# Patient Record
Sex: FEMALE | NOT HISPANIC OR LATINO | Employment: FULL TIME | URBAN - METROPOLITAN AREA
[De-identification: names, ages, dates, MRNs, and addresses within clinical notes are randomized per-mention and may not be internally consistent; named-entity substitution may affect disease eponyms.]

---

## 2018-08-09 ENCOUNTER — OFFICE VISIT (OUTPATIENT)
Dept: FAMILY MEDICINE | Facility: CLINIC | Age: 48
End: 2018-08-09
Payer: COMMERCIAL

## 2018-08-09 VITALS
SYSTOLIC BLOOD PRESSURE: 102 MMHG | WEIGHT: 131.6 LBS | TEMPERATURE: 99 F | HEART RATE: 72 BPM | RESPIRATION RATE: 12 BRPM | HEIGHT: 65 IN | DIASTOLIC BLOOD PRESSURE: 60 MMHG | BODY MASS INDEX: 21.92 KG/M2 | OXYGEN SATURATION: 97 %

## 2018-08-09 DIAGNOSIS — Z00.00 ROUTINE MEDICAL EXAM: Primary | ICD-10-CM

## 2018-08-09 DIAGNOSIS — Z12.31 SCREENING MAMMOGRAM, ENCOUNTER FOR: ICD-10-CM

## 2018-08-09 LAB
BILIRUBIN, POC: NEGATIVE
BLOOD URINE, POC: NEGATIVE
CLARITY, POC: CLEAR
COLOR, POC: YELLOW
GLUCOSE URINE, POC: NEGATIVE
KETONES, POC: NEGATIVE
LEUKOCYTE EST, POC: NEGATIVE
NITRITE, POC: NEGATIVE
PH, POC: 7
PROTEIN, POC: NEGATIVE
SPECIFIC GRAVITY, POC: 1
UROBILINOGEN, POC: 0.2

## 2018-08-09 PROCEDURE — 93000 ELECTROCARDIOGRAM COMPLETE: CPT | Performed by: FAMILY MEDICINE

## 2018-08-09 PROCEDURE — 99386 PREV VISIT NEW AGE 40-64: CPT | Performed by: FAMILY MEDICINE

## 2018-08-09 PROCEDURE — 81002 URINALYSIS NONAUTO W/O SCOPE: CPT | Performed by: FAMILY MEDICINE

## 2018-08-09 ASSESSMENT — VISUAL ACUITY
OS_CC: 20/50
OD_CC: 20/25

## 2018-08-09 NOTE — PROGRESS NOTES
[facility] at [facility]     Reason for visit:   Chief Complaint   Patient presents with   • Annual Exam      HPI  Emma Bojorquez is a 48 y.o. female    Has a history of 1-2 uti per year  Using cranberry tablets, helping her    Exercising 7 days per week  Eating healthy  Taking mvi daily      HM  Last pap - 2018, wnl  Last mammogram - not done yet       Review of Systems   Constitutional: Negative for chills, fatigue, fever and unexpected weight change.   HENT: Negative for ear pain, hearing loss, postnasal drip, sinus pressure, sore throat and trouble swallowing.    Eyes: Negative for pain and redness.   Respiratory: Negative for cough, shortness of breath and wheezing.    Cardiovascular: Negative for chest pain and palpitations.   Gastrointestinal: Negative for abdominal pain, constipation, diarrhea, nausea and vomiting.   Skin: Negative for color change and rash.   Neurological: Negative for dizziness, weakness, light-headedness and headaches.   Psychiatric/Behavioral: Negative for suicidal ideas. The patient is not nervous/anxious.    All other systems reviewed and are negative.       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 No pertinent past medical history.  Past Surgical History:   Procedure Laterality Date   • APPENDECTOMY  1996     Social History     Social History   • Marital status:      Spouse name: N/A   • Number of children: N/A   • Years of education: N/A     Occupational History   • Not on file.     Social History Main Topics   • Smoking status: Never Smoker   • Smokeless tobacco: Never Used   • Alcohol use No   • Drug use: No   • Sexual activity: Not on file     Other Topics Concern   • Not on file     Social History Narrative   • No narrative on file     Family History   Problem Relation Age of Onset   • No Known Problems Mother    • No Known Problems Father        Allergies:  Patient  "has no known allergies.    Current Outpatient Prescriptions   Medication Sig Dispense Refill   • cranberry fruit extract (CRANBERRY ORAL) Take by mouth.       No current facility-administered medications for this visit.         Objective   Vitals:    08/09/18 1014   BP: 102/60   Pulse: 72   Resp: 12   Temp: 37.2 °C (99 °F)   TempSrc: Oral   SpO2: 97%   Weight: 59.7 kg (131 lb 9.6 oz)   Height: 1.638 m (5' 4.5\")     Ht Readings from Last 3 Encounters:   08/09/18 1.638 m (5' 4.5\")     Wt Readings from Last 3 Encounters:   08/09/18 59.7 kg (131 lb 9.6 oz)     Body mass index is 22.24 kg/m².    Physical Exam   Constitutional: She is oriented to person, place, and time. She appears well-developed and well-nourished.   HENT:   Head: Normocephalic and atraumatic.   Right Ear: Hearing, tympanic membrane, external ear and ear canal normal.   Left Ear: Hearing, tympanic membrane, external ear and ear canal normal.   Nose: Nose normal.   Mouth/Throat: Uvula is midline, oropharynx is clear and moist and mucous membranes are normal. No oropharyngeal exudate or posterior oropharyngeal edema. Tonsils are 0 on the right. Tonsils are 0 on the left. No tonsillar exudate.   Eyes: Conjunctivae, EOM and lids are normal. Pupils are equal, round, and reactive to light.   Neck: Normal range of motion. Neck supple. Carotid bruit is not present. No thyromegaly present.   Cardiovascular: Normal rate, regular rhythm, normal heart sounds and intact distal pulses.  Exam reveals no gallop and no friction rub.    No murmur heard.  Pulmonary/Chest: Effort normal and breath sounds normal. She has no wheezes. She has no rhonchi. She has no rales. She exhibits no deformity.   Abdominal: Soft. Bowel sounds are normal. There is no hepatosplenomegaly. There is no tenderness. There is no rebound and no guarding.   Lymphadenopathy:        Head (right side): No submandibular adenopathy present.        Head (left side): No submandibular adenopathy present. "     She has no cervical adenopathy.        Right: No inguinal adenopathy present.        Left: No inguinal adenopathy present.   Neurological: She is alert and oriented to person, place, and time. She has normal strength. No cranial nerve deficit.   Reflex Scores:       Bicep reflexes are 2+ on the right side and 2+ on the left side.       Patellar reflexes are 2+ on the right side and 2+ on the left side.  Skin: Skin is warm, dry and intact. Capillary refill takes less than 2 seconds. No rash noted.   Psychiatric: She has a normal mood and affect. Her speech is normal and behavior is normal. Judgment and thought content normal. Cognition and memory are normal.   Nursing note and vitals reviewed.       No results found for: WBC, HGB, HCT, PLT, NA, K, CL, BUN, CREATININE, CO2, ALT, AST, CHOL, TRIG, HDL, LDLCALC, LDLDIRECT, TSH, PSA, INR, HGBA1C, MICROALBUR, HSVT30ERS, HEPCAB    Point of Care Testing Results  Results for orders placed or performed in visit on 08/09/18   POCT urinalysis dipstick   Result Value Ref Range    Color, UA Yellow     Clarity, UA Clear     Glucose, UA Negative     Bilirubin, UA Negative     Ketones, UA Negative     Spec Grav, UA 1.000     Blood, UA Negative     pH, UA 7.0     Protein, UA Negative     Urobilinogen, UA 0.2     Leukocytes, UA Negative     Nitrite, UA Negative         Assessment   Problem List Items Addressed This Visit     None      Visit Diagnoses     Routine medical exam    -  Primary    Relevant Orders    POCT urinalysis dipstick (Completed)    ECG 12 LEAD OFFICE PERFORMED (Completed)    CBC and Differential    Comprehensive metabolic panel    Lipid panel    TSH w reflex FT4    Screening mammogram, encounter for        Relevant Orders    BI SCREENING MAMMOGRAM BILATERAL        Recommend healthy diet/ exercise  Encourage weight management to appropriate goal as discussed with patient.  Check labs as listed  Check ekg -> sinus rhythm, normal ekg  Mammogram script given  Continue  cranberry tablets daily with fluid  Discussed history of positive ppd and bcg vaccine      Patient has been educated on the risks, benefits, and side effects of all medication prescribed at this visit.  Patient expressed understanding and agreement with plan.  Return if symptoms worsen or fail to improve.  Nguyen Barrera MD  8/10/2018       There are no Patient Instructions on file for this visit.

## 2018-08-10 ASSESSMENT — ENCOUNTER SYMPTOMS
SHORTNESS OF BREATH: 0
COLOR CHANGE: 0
TROUBLE SWALLOWING: 0
CONSTIPATION: 0
NERVOUS/ANXIOUS: 0
SINUS PRESSURE: 0
DIARRHEA: 0
SORE THROAT: 0
LIGHT-HEADEDNESS: 0
HEADACHES: 0
PALPITATIONS: 0
FEVER: 0
EYE PAIN: 0
CHILLS: 0
WEAKNESS: 0
FATIGUE: 0
NAUSEA: 0
EYE REDNESS: 0
COUGH: 0
ABDOMINAL PAIN: 0
DIZZINESS: 0
WHEEZING: 0
UNEXPECTED WEIGHT CHANGE: 0
VOMITING: 0

## 2018-09-09 LAB
ALBUMIN SERPL-MCNC: 4.3 G/DL (ref 3.6–5.1)
ALBUMIN/GLOB SERPL: 1.5 (CALC) (ref 1–2.5)
ALP SERPL-CCNC: 60 U/L (ref 33–115)
ALT SERPL-CCNC: 12 U/L (ref 6–29)
AST SERPL-CCNC: 13 U/L (ref 10–35)
BASOPHILS # BLD AUTO: 29 CELLS/UL (ref 0–200)
BASOPHILS NFR BLD AUTO: 0.5 %
BILIRUB SERPL-MCNC: 0.8 MG/DL (ref 0.2–1.2)
BUN SERPL-MCNC: 10 MG/DL (ref 7–25)
BUN/CREAT SERPL: NORMAL (CALC) (ref 6–22)
CALCIUM SERPL-MCNC: 9.1 MG/DL (ref 8.6–10.2)
CHLORIDE SERPL-SCNC: 104 MMOL/L (ref 98–110)
CHOLEST SERPL-MCNC: 192 MG/DL
CHOLEST/HDLC SERPL: 3.3 (CALC)
CO2 SERPL-SCNC: 25 MMOL/L (ref 20–32)
CREAT SERPL-MCNC: 0.72 MG/DL (ref 0.5–1.1)
EOSINOPHIL # BLD AUTO: 80 CELLS/UL (ref 15–500)
EOSINOPHIL NFR BLD AUTO: 1.4 %
ERYTHROCYTE [DISTWIDTH] IN BLOOD BY AUTOMATED COUNT: 12.2 % (ref 11–15)
GFR SERPL CREATININE-BSD FRML MDRD: 100 ML/MIN/1.73M2
GLOBULIN SER CALC-MCNC: 2.8 G/DL (CALC) (ref 1.9–3.7)
GLUCOSE SERPL-MCNC: 82 MG/DL (ref 65–99)
HCT VFR BLD AUTO: 40 % (ref 35–45)
HDLC SERPL-MCNC: 59 MG/DL
HGB BLD-MCNC: 13.3 G/DL (ref 11.7–15.5)
LDLC SERPL CALC-MCNC: 113 MG/DL (CALC)
LYMPHOCYTES # BLD AUTO: 1476 CELLS/UL (ref 850–3900)
LYMPHOCYTES NFR BLD AUTO: 25.9 %
MCH RBC QN AUTO: 32.7 PG (ref 27–33)
MCHC RBC AUTO-ENTMCNC: 33.3 G/DL (ref 32–36)
MCV RBC AUTO: 98.3 FL (ref 80–100)
MONOCYTES # BLD AUTO: 342 CELLS/UL (ref 200–950)
MONOCYTES NFR BLD AUTO: 6 %
NEUTROPHILS # BLD AUTO: 3773 CELLS/UL (ref 1500–7800)
NEUTROPHILS NFR BLD AUTO: 66.2 %
NONHDLC SERPL-MCNC: 133 MG/DL (CALC)
PLATELET # BLD AUTO: 285 THOUSAND/UL (ref 140–400)
PMV BLD REES-ECKER: 12.1 FL (ref 7.5–12.5)
POTASSIUM SERPL-SCNC: 4 MMOL/L (ref 3.5–5.3)
PROT SERPL-MCNC: 7.1 G/DL (ref 6.1–8.1)
RBC # BLD AUTO: 4.07 MILLION/UL (ref 3.8–5.1)
SODIUM SERPL-SCNC: 138 MMOL/L (ref 135–146)
TRIGL SERPL-MCNC: 96 MG/DL
TSH SERPL-ACNC: 1.55 MIU/L
WBC # BLD AUTO: 5.7 THOUSAND/UL (ref 3.8–10.8)

## 2018-09-11 ENCOUNTER — TELEPHONE (OUTPATIENT)
Dept: FAMILY MEDICINE | Facility: CLINIC | Age: 48
End: 2018-09-11

## 2018-09-11 NOTE — TELEPHONE ENCOUNTER
----- Message from Nguyen Barrera MD sent at 9/10/2018  7:02 PM EDT -----  Labs are wnl, lipids wnl

## 2018-09-21 ENCOUNTER — TELEPHONE (OUTPATIENT)
Dept: FAMILY MEDICINE | Facility: CLINIC | Age: 48
End: 2018-09-21

## 2018-09-21 NOTE — TELEPHONE ENCOUNTER
Pt stated she is experiencing very dry scalp and skin (forehead), which is shedding. She is asking what she could use to help alleviate the dryness and should she come in for an appt.

## 2018-09-24 NOTE — TELEPHONE ENCOUNTER
Recommend shampoo containing selenium sulfide such as head and shoulders 2x per week.  Helps rejuvenate the scalp.

## 2019-04-12 ENCOUNTER — OFFICE VISIT (OUTPATIENT)
Dept: FAMILY MEDICINE | Facility: CLINIC | Age: 49
End: 2019-04-12
Payer: COMMERCIAL

## 2019-04-12 VITALS
DIASTOLIC BLOOD PRESSURE: 60 MMHG | SYSTOLIC BLOOD PRESSURE: 102 MMHG | OXYGEN SATURATION: 98 % | HEART RATE: 68 BPM | TEMPERATURE: 98.1 F | BODY MASS INDEX: 21.99 KG/M2 | WEIGHT: 130.13 LBS | RESPIRATION RATE: 16 BRPM

## 2019-04-12 DIAGNOSIS — R10.12 LEFT UPPER QUADRANT PAIN: Primary | ICD-10-CM

## 2019-04-12 DIAGNOSIS — Z00.00 LABORATORY EXAM ORDERED AS PART OF ROUTINE GENERAL MEDICAL EXAMINATION: ICD-10-CM

## 2019-04-12 PROCEDURE — 99213 OFFICE O/P EST LOW 20 MIN: CPT | Performed by: FAMILY MEDICINE

## 2019-04-12 NOTE — PROGRESS NOTES
[facility] Medicine at [facility]     Reason for visit:   Chief Complaint   Patient presents with   • Pain     left side off and on      Liyjudah Bojorquez is a 49 y.o. female    HPI    Mild pain on L side of abdomen for 6months  Comes and goes, no radiation  No accident or injury  Pain over lower ribs on the side of abdomen  Worse during the week as the day progresses  Never feels pain on the weekend, denies bending or lifting  No nausea/ vomiting/ diarrhea/ constipation/ dysuria  Doing some light exercises, but no change recently  No abd/ core exercises    No meds tried     Review of Systems   Constitutional: Negative for chills, fever and unexpected weight change.   HENT: Negative for ear pain, postnasal drip, sinus pain and sore throat.    Eyes: Negative for pain.   Respiratory: Negative for cough and shortness of breath.    Cardiovascular: Negative for chest pain and palpitations.   Gastrointestinal: Positive for abdominal pain. Negative for diarrhea, nausea and vomiting.   Musculoskeletal: Negative for arthralgias and joint swelling.   Skin: Negative for rash and wound.   Neurological: Negative for dizziness, syncope, weakness and headaches.   Psychiatric/Behavioral: The patient is not nervous/anxious.    All other systems reviewed and are negative.       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 No pertinent past medical history.  Past Surgical History:   Procedure Laterality Date   • APPENDECTOMY  1996     Social History     Social History   • Marital status:      Spouse name: N/A   • Number of children: N/A   • Years of education: N/A     Occupational History   • Not on file.     Social History Main Topics   • Smoking status: Never Smoker   • Smokeless tobacco: Never Used   • Alcohol use No   • Drug use: No   • Sexual activity: Not on file     Other Topics Concern   • Not on file     Social History Narrative  "  • No narrative on file     Family History   Problem Relation Age of Onset   • No Known Problems Mother    • No Known Problems Father        Allergies:  Patient has no known allergies.    Current Outpatient Prescriptions   Medication Sig Dispense Refill   • cranberry fruit extract (CRANBERRY ORAL) Take by mouth.       No current facility-administered medications for this visit.         Objective   Vitals:    04/12/19 1144   BP: 102/60   Pulse: 68   Resp: 16   Temp: 36.7 °C (98.1 °F)   SpO2: 98%   Weight: 59 kg (130 lb 2 oz)     Ht Readings from Last 3 Encounters:   08/09/18 1.638 m (5' 4.5\")     Wt Readings from Last 3 Encounters:   04/12/19 59 kg (130 lb 2 oz)   08/09/18 59.7 kg (131 lb 9.6 oz)     Body mass index is 21.99 kg/m².    Physical Exam   Constitutional: She is oriented to person, place, and time. She appears well-developed and well-nourished. No distress.   Eyes: Conjunctivae and EOM are normal.   Neck: Normal range of motion. Neck supple.   Cardiovascular: Normal rate and regular rhythm.  Exam reveals no gallop and no friction rub.    No murmur heard.  Pulmonary/Chest: Effort normal. She has no wheezes.   Abdominal:   +bowel sounds, non distended, abdomen is soft  Mild diffuse tenderness in L sided lower floating ribs and overlying soft tissue.  No true abdominal pain.   Neurological: She is alert and oriented to person, place, and time.   Skin: Skin is warm and dry.   Psychiatric: She has a normal mood and affect. Her behavior is normal. Judgment and thought content normal.   Vitals reviewed.      Point of Care Testing Results  No results found for this visit on 04/12/19.     Assessment   Diagnoses and all orders for this visit:    Left upper quadrant pain (Primary)  Comments:  suspect muscular pain from positioning at work  heat/ gentle stretching  ibuprofen 400mg qd x 4 days      Laboratory exam ordered as part of routine general medical examination  -     CBC and Differential; Future  -     " Comprehensive metabolic panel; Future  -     Lipid panel; Future  -     TSH 3rd Generation; Future        Patient has been educated on the risks, benefits, and side effects of all medication prescribed at this visit, and will contact me with any further questions.  Patient expressed understanding and agreement with plan.  Return if symptoms worsen or fail to improve.    Nguyen Barrera MD  4/14/2019       There are no Patient Instructions on file for this visit.

## 2019-04-14 ASSESSMENT — ENCOUNTER SYMPTOMS
CHILLS: 0
FEVER: 0
JOINT SWELLING: 0
HEADACHES: 0
COUGH: 0
SINUS PAIN: 0
UNEXPECTED WEIGHT CHANGE: 0
VOMITING: 0
ARTHRALGIAS: 0
DIARRHEA: 0
SORE THROAT: 0
DIZZINESS: 0
WOUND: 0
NERVOUS/ANXIOUS: 0
SHORTNESS OF BREATH: 0
NAUSEA: 0
WEAKNESS: 0
EYE PAIN: 0
ABDOMINAL PAIN: 1
PALPITATIONS: 0

## 2019-05-30 ENCOUNTER — TELEPHONE (OUTPATIENT)
Dept: FAMILY MEDICINE | Facility: CLINIC | Age: 49
End: 2019-05-30

## 2019-05-30 RX ORDER — ACETAZOLAMIDE 125 MG/1
TABLET ORAL
Qty: 10 TABLET | Refills: 0 | Status: SHIPPED | OUTPATIENT
Start: 2019-05-30 | End: 2021-11-22

## 2019-05-30 NOTE — TELEPHONE ENCOUNTER
acteazolamide sent to pharmacy.  Advise pt to start this the day before her ascent to high altitude and to be very well hydrated.

## 2019-05-30 NOTE — TELEPHONE ENCOUNTER
Pt is requesting medication for high altitude, (Acetajolamide). Pt has a trip to [place], coming up June 8th. Pt would like medication sent to her local pharm.

## 2019-09-15 LAB
ALBUMIN SERPL-MCNC: 4.5 G/DL (ref 3.6–5.1)
ALBUMIN/GLOB SERPL: 1.7 (CALC) (ref 1–2.5)
ALP SERPL-CCNC: 59 U/L (ref 33–115)
ALT SERPL-CCNC: 11 U/L (ref 6–29)
AST SERPL-CCNC: 14 U/L (ref 10–35)
BASOPHILS # BLD AUTO: 31 CELLS/UL (ref 0–200)
BASOPHILS NFR BLD AUTO: 0.5 %
BILIRUB SERPL-MCNC: 0.5 MG/DL (ref 0.2–1.2)
BUN SERPL-MCNC: 11 MG/DL (ref 7–25)
BUN/CREAT SERPL: NORMAL (CALC) (ref 6–22)
CALCIUM SERPL-MCNC: 9.5 MG/DL (ref 8.6–10.2)
CHLORIDE SERPL-SCNC: 103 MMOL/L (ref 98–110)
CHOLEST SERPL-MCNC: 196 MG/DL
CHOLEST/HDLC SERPL: 3.2 (CALC)
CO2 SERPL-SCNC: 29 MMOL/L (ref 20–32)
CREAT SERPL-MCNC: 0.69 MG/DL (ref 0.5–1.1)
EOSINOPHIL # BLD AUTO: 61 CELLS/UL (ref 15–500)
EOSINOPHIL NFR BLD AUTO: 1 %
ERYTHROCYTE [DISTWIDTH] IN BLOOD BY AUTOMATED COUNT: 12.3 % (ref 11–15)
GLOBULIN SER CALC-MCNC: 2.6 G/DL (CALC) (ref 1.9–3.7)
GLUCOSE SERPL-MCNC: 83 MG/DL (ref 65–99)
HCT VFR BLD AUTO: 41.9 % (ref 35–45)
HDLC SERPL-MCNC: 61 MG/DL
HGB BLD-MCNC: 13.8 G/DL (ref 11.7–15.5)
LDLC SERPL CALC-MCNC: 116 MG/DL (CALC)
LYMPHOCYTES # BLD AUTO: 1732 CELLS/UL (ref 850–3900)
LYMPHOCYTES NFR BLD AUTO: 28.4 %
MCH RBC QN AUTO: 32.1 PG (ref 27–33)
MCHC RBC AUTO-ENTMCNC: 32.9 G/DL (ref 32–36)
MCV RBC AUTO: 97.4 FL (ref 80–100)
MONOCYTES # BLD AUTO: 421 CELLS/UL (ref 200–950)
MONOCYTES NFR BLD AUTO: 6.9 %
NEUTROPHILS # BLD AUTO: 3855 CELLS/UL (ref 1500–7800)
NEUTROPHILS NFR BLD AUTO: 63.2 %
NONHDLC SERPL-MCNC: 135 MG/DL (CALC)
PLATELET # BLD AUTO: 298 THOUSAND/UL (ref 140–400)
PMV BLD REES-ECKER: 12.5 FL (ref 7.5–12.5)
POTASSIUM SERPL-SCNC: 4.2 MMOL/L (ref 3.5–5.3)
PROT SERPL-MCNC: 7.1 G/DL (ref 6.1–8.1)
QUEST EGFR NON-AFR. AMERICAN: 104 ML/MIN/1.73M2
RBC # BLD AUTO: 4.3 MILLION/UL (ref 3.8–5.1)
SODIUM SERPL-SCNC: 138 MMOL/L (ref 135–146)
TRIGL SERPL-MCNC: 91 MG/DL
TSH SERPL-ACNC: 1.33 MIU/L
WBC # BLD AUTO: 6.1 THOUSAND/UL (ref 3.8–10.8)

## 2019-10-04 ENCOUNTER — OFFICE VISIT (OUTPATIENT)
Dept: FAMILY MEDICINE | Facility: CLINIC | Age: 49
End: 2019-10-04
Payer: COMMERCIAL

## 2019-10-04 VITALS
SYSTOLIC BLOOD PRESSURE: 102 MMHG | TEMPERATURE: 98.2 F | WEIGHT: 129.5 LBS | HEART RATE: 62 BPM | OXYGEN SATURATION: 98 % | RESPIRATION RATE: 14 BRPM | BODY MASS INDEX: 21.89 KG/M2 | DIASTOLIC BLOOD PRESSURE: 60 MMHG

## 2019-10-04 DIAGNOSIS — Z23 NEED FOR VACCINATION: ICD-10-CM

## 2019-10-04 DIAGNOSIS — M79.10 MUSCLE PAIN: Primary | ICD-10-CM

## 2019-10-04 PROCEDURE — 90686 IIV4 VACC NO PRSV 0.5 ML IM: CPT | Performed by: FAMILY MEDICINE

## 2019-10-04 PROCEDURE — 90471 IMMUNIZATION ADMIN: CPT | Performed by: FAMILY MEDICINE

## 2019-10-04 PROCEDURE — 99213 OFFICE O/P EST LOW 20 MIN: CPT | Mod: 25 | Performed by: FAMILY MEDICINE

## 2019-10-04 NOTE — PROGRESS NOTES
[facility] Family Medicine at [facility]     Reason for visit:   Chief Complaint   Patient presents with   • Follow-up     qudrant pain and labs      Emma Bojorquez is a 49 y.o. female    HPI    Had uti - seeing urology for cyst on kidney  Had ultrasounds and ct scans, cysts are coming and going    L sided abdominal/ back pain  Comes and goes  Exercising regularly, daily walking 40min  Doing helga on the weekend  Pain started after her most recent helga class      HM  Mammo- 2018, due for this year       Review of Systems   Constitutional: Negative for chills, fever and unexpected weight change.   HENT: Negative for ear pain, postnasal drip, sinus pain and sore throat.    Eyes: Negative for pain.   Respiratory: Negative for cough and shortness of breath.    Cardiovascular: Negative for chest pain and palpitations.   Gastrointestinal: Positive for abdominal pain. Negative for diarrhea, nausea and vomiting.   Musculoskeletal: Positive for back pain. Negative for arthralgias and joint swelling.   Skin: Negative for rash and wound.   Neurological: Negative for dizziness, syncope, weakness and headaches.   Psychiatric/Behavioral: The patient is not nervous/anxious.    All other systems reviewed and are negative.       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 No pertinent past medical history.  Past Surgical History:   Procedure Laterality Date   • APPENDECTOMY  1996     Social History     Social History   • Marital status:      Spouse name: N/A   • Number of children: N/A   • Years of education: N/A     Occupational History   • Not on file.     Social History Main Topics   • Smoking status: Never Smoker   • Smokeless tobacco: Never Used   • Alcohol use No   • Drug use: No   • Sexual activity: Not on file     Other Topics Concern   • Not on file     Social History Narrative   • No narrative on file     Family History   Problem  "Relation Age of Onset   • No Known Problems Biological Mother    • No Known Problems Biological Father        Allergies:  Patient has no known allergies.      Outpatient Encounter Prescriptions as of 10/4/2019:   •  acetaZOLAMIDE (DIAMOX) 125 mg tablet, Take 1 tablet twice a day starting the day before ascent.  Continue 3 days afterwards.  •  cranberry fruit extract (CRANBERRY ORAL), Take by mouth.     Objective   Vitals:    10/04/19 1016   BP: 102/60   Pulse: 62   Resp: 14   Temp: 36.8 °C (98.2 °F)   SpO2: 98%   Weight: 58.7 kg (129 lb 8 oz)     Ht Readings from Last 3 Encounters:   08/09/18 1.638 m (5' 4.5\")     Wt Readings from Last 3 Encounters:   10/04/19 58.7 kg (129 lb 8 oz)   04/12/19 59 kg (130 lb 2 oz)   08/09/18 59.7 kg (131 lb 9.6 oz)     Body mass index is 21.89 kg/m².    Physical Exam   Constitutional: She is oriented to person, place, and time. She appears well-developed and well-nourished. No distress.   Eyes: Conjunctivae and EOM are normal.   Neck: Normal range of motion. Neck supple.   Cardiovascular: Normal rate and regular rhythm.  Exam reveals no gallop and no friction rub.    No murmur heard.  Pulmonary/Chest: Effort normal. She has no wheezes.   Musculoskeletal:   No tenderness to palpation but pt feels tender on L side abdominal wall over muscules   Neurological: She is alert and oriented to person, place, and time.   Skin: Skin is warm and dry.   Psychiatric: She has a normal mood and affect. Her behavior is normal. Judgment and thought content normal.   Vitals reviewed.      Point of Care Testing Results  No results found for this visit on 10/04/19.     Assessment   Diagnoses and all orders for this visit:    Muscle pain (Primary)  Comments:  suspect muscle pain after exercise  ice alt with heat  ibuprofen   stretching exercises demonstrated    Need for vaccination  -     Influenza vaccine quadrivalent preservative free 6 mon and older IM        Patient has been educated on the risks, " benefits, and side effects of all medication prescribed at this visit, and will contact me with any further questions.  Patient expressed understanding and agreement with plan.  Return if symptoms worsen or fail to improve.    Nguyen Barrera MD  10/6/2019       There are no Patient Instructions on file for this visit.

## 2019-10-06 ASSESSMENT — ENCOUNTER SYMPTOMS
HEADACHES: 0
UNEXPECTED WEIGHT CHANGE: 0
ARTHRALGIAS: 0
WEAKNESS: 0
JOINT SWELLING: 0
SINUS PAIN: 0
WOUND: 0
DIARRHEA: 0
SHORTNESS OF BREATH: 0
CHILLS: 0
VOMITING: 0
NAUSEA: 0
FEVER: 0
PALPITATIONS: 0
EYE PAIN: 0
BACK PAIN: 1
COUGH: 0
DIZZINESS: 0
SORE THROAT: 0
ABDOMINAL PAIN: 1
NERVOUS/ANXIOUS: 0

## 2021-04-15 DIAGNOSIS — Z23 ENCOUNTER FOR IMMUNIZATION: ICD-10-CM

## 2021-10-26 ENCOUNTER — TRANSCRIBE ORDERS (OUTPATIENT)
Dept: SCHEDULING | Age: 51
End: 2021-10-26
Payer: COMMERCIAL

## 2021-10-26 ENCOUNTER — TRANSCRIBE ORDERS (OUTPATIENT)
Dept: RADIOLOGY | Age: 51
End: 2021-10-26
Payer: COMMERCIAL

## 2021-10-26 ENCOUNTER — HOSPITAL ENCOUNTER (OUTPATIENT)
Dept: RADIOLOGY | Age: 51
Discharge: HOME | End: 2021-10-26
Attending: NURSE PRACTITIONER
Payer: COMMERCIAL

## 2021-10-26 DIAGNOSIS — R53.83 OTHER FATIGUE: ICD-10-CM

## 2021-10-26 DIAGNOSIS — E04.9 NONTOXIC GOITER, UNSPECIFIED: ICD-10-CM

## 2021-10-26 DIAGNOSIS — E04.9 NONTOXIC GOITER, UNSPECIFIED: Primary | ICD-10-CM

## 2021-10-26 PROCEDURE — 76536 US EXAM OF HEAD AND NECK: CPT

## 2021-10-27 ENCOUNTER — TELEPHONE (OUTPATIENT)
Dept: ENDOCRINOLOGY | Facility: CLINIC | Age: 51
End: 2021-10-27

## 2021-10-27 ENCOUNTER — OFFICE VISIT (OUTPATIENT)
Dept: FAMILY MEDICINE | Facility: CLINIC | Age: 51
End: 2021-10-27
Payer: COMMERCIAL

## 2021-10-27 VITALS
SYSTOLIC BLOOD PRESSURE: 104 MMHG | DIASTOLIC BLOOD PRESSURE: 60 MMHG | HEART RATE: 100 BPM | BODY MASS INDEX: 19.43 KG/M2 | WEIGHT: 115 LBS | RESPIRATION RATE: 16 BRPM | TEMPERATURE: 98.2 F | OXYGEN SATURATION: 98 %

## 2021-10-27 DIAGNOSIS — Z00.00 LABORATORY EXAM ORDERED AS PART OF ROUTINE GENERAL MEDICAL EXAMINATION: ICD-10-CM

## 2021-10-27 DIAGNOSIS — E04.1 THYROID NODULE: Primary | ICD-10-CM

## 2021-10-27 PROCEDURE — 3008F BODY MASS INDEX DOCD: CPT | Performed by: FAMILY MEDICINE

## 2021-10-27 PROCEDURE — 99214 OFFICE O/P EST MOD 30 MIN: CPT | Performed by: FAMILY MEDICINE

## 2021-10-27 NOTE — PROGRESS NOTES
[facility] Family Medicine at [facility]     Reason for visit:   Chief Complaint   Patient presents with   • Follow-up     urgent care swelling in neck       Emma Bojorquez is a 51 y.o. female    Pt has gone to the  recently  Had inflammation in her thyroid gland for 10 days   Feels some pain with swallowing         Review of Systems   Constitutional: Negative for chills, fever and unexpected weight change.   HENT: Negative for ear pain, postnasal drip, sinus pain and sore throat.    Eyes: Negative for pain.   Respiratory: Negative for cough and shortness of breath.    Cardiovascular: Negative for chest pain and palpitations.   Gastrointestinal: Negative for abdominal pain, diarrhea, nausea and vomiting.   Musculoskeletal: Negative for arthralgias and joint swelling.   Skin: Negative for rash and wound.   Neurological: Negative for dizziness, syncope, weakness and headaches.   Psychiatric/Behavioral: The patient is not nervous/anxious.    All other systems reviewed and are negative.         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 No pertinent past medical history.  Past Surgical History:   Procedure Laterality Date   • APPENDECTOMY  1996     Social History     Socioeconomic History   • Marital status:      Spouse name: Not on file   • Number of children: Not on file   • Years of education: Not on file   • Highest education level: Not on file   Occupational History   • Not on file   Tobacco Use   • Smoking status: Never Smoker   • Smokeless tobacco: Never Used   Substance and Sexual Activity   • Alcohol use: No   • Drug use: No   • Sexual activity: Not on file   Other Topics Concern   • Not on file   Social History Narrative   • Not on file     Social Determinants of Health     Financial Resource Strain:    • Difficulty of Paying Living Expenses: Not on file   Food Insecurity:    • Worried About  "Running Out of Food in the Last Year: Not on file   • Ran Out of Food in the Last Year: Not on file   Transportation Needs:    • Lack of Transportation (Medical): Not on file   • Lack of Transportation (Non-Medical): Not on file   Physical Activity:    • Days of Exercise per Week: Not on file   • Minutes of Exercise per Session: Not on file   Stress:    • Feeling of Stress : Not on file   Social Connections:    • Frequency of Communication with Friends and Family: Not on file   • Frequency of Social Gatherings with Friends and Family: Not on file   • Attends Restorationist Services: Not on file   • Active Member of Clubs or Organizations: Not on file   • Attends Club or Organization Meetings: Not on file   • Marital Status: Not on file   Intimate Partner Violence:    • Fear of Current or Ex-Partner: Not on file   • Emotionally Abused: Not on file   • Physically Abused: Not on file   • Sexually Abused: Not on file   Housing Stability:    • Unable to Pay for Housing in the Last Year: Not on file   • Number of Places Lived in the Last Year: Not on file   • Unstable Housing in the Last Year: Not on file     Family History   Problem Relation Age of Onset   • No Known Problems Biological Mother    • No Known Problems Biological Father        Allergies:  Patient has no known allergies.      Outpatient Encounter Medications as of 10/27/2021:   •  acetaZOLAMIDE (DIAMOX) 125 mg tablet, Take 1 tablet twice a day starting the day before ascent.  Continue 3 days afterwards. (Patient not taking: Reported on 10/27/2021 )  •  cranberry fruit extract (CRANBERRY ORAL), Take by mouth.     Objective   Vitals:    10/27/21 1326   BP: 104/60   Pulse: 100   Resp: 16   Temp: 36.8 °C (98.2 °F)   SpO2: 98%   Weight: 52.2 kg (115 lb)     Ht Readings from Last 3 Encounters:   08/09/18 1.638 m (5' 4.5\")     Wt Readings from Last 3 Encounters:   10/27/21 52.2 kg (115 lb)   10/04/19 58.7 kg (129 lb 8 oz)   04/12/19 59 kg (130 lb 2 oz)     Body mass " index is 19.43 kg/m².    Physical Exam  Vitals reviewed.   Constitutional:       General: She is not in acute distress.     Appearance: Normal appearance. She is well-developed. She is not ill-appearing.   HENT:      Head: Normocephalic and atraumatic.      Nose: Nose normal. No congestion.   Eyes:      Conjunctiva/sclera: Conjunctivae normal.      Pupils: Pupils are equal, round, and reactive to light.   Neck:      Comments: Palpable nodule in neck near thyroid gland, freely mobile and non tender  Cardiovascular:      Heart sounds: S1 normal and S2 normal.   Musculoskeletal:      Cervical back: Normal range of motion and neck supple.      Right lower leg: No edema.      Left lower leg: No edema.   Skin:     General: Skin is warm and dry.   Neurological:      Mental Status: She is alert and oriented to person, place, and time.      Gait: Gait normal.      Comments: Speaking clearly and understandably   Psychiatric:         Mood and Affect: Mood normal.         Behavior: Behavior normal.         Thought Content: Thought content normal.         Judgment: Judgment normal.         Point of Care Testing Results  No results found for this visit on 10/27/21.     Assessment   Diagnoses and all orders for this visit:    Thyroid nodule (Primary)  Reviewed ultrasound thyroid, nodule is 1.8 cm x 3.2 cm  Pt's recent tsh suggests hyperfunctioning thyroid- pt made aware.  I recommend consulting with endo for possible nodule biopsy- will have staff help get an apt with endo.    Laboratory exam ordered as part of routine general medical examination  -     Lipid panel; Future    Patient has been educated on the risks, benefits, and side effects of all medication prescribed at this visit, or dose changes.  Patient expressed understanding and agreement with plan.  Return if symptoms worsen or fail to improve.    Nguyen Barrera MD  10/30/2021       There are no Patient Instructions on file for this visit.

## 2021-10-28 ENCOUNTER — TELEPHONE (OUTPATIENT)
Dept: ENDOCRINOLOGY | Facility: CLINIC | Age: 51
End: 2021-10-28

## 2021-10-28 NOTE — TELEPHONE ENCOUNTER
Reached out to patient several times to schedule a new patient appointment. Patients phone rings and then becomes a busy signal. No voicemail to leave message

## 2021-10-28 NOTE — TELEPHONE ENCOUNTER
Patient is asking if the office will be able to do her biopsy on her new patient appointment scheduled for 11/5 in [place]. Please call patient and advise.

## 2021-10-29 NOTE — TELEPHONE ENCOUNTER
Unable to leave  for pt - phone rings and then a busy signal. Visit on 10/5 is a consult. We will refer pt to hospital for biopsy

## 2021-10-30 ASSESSMENT — ENCOUNTER SYMPTOMS
FEVER: 0
ABDOMINAL PAIN: 0
EYE PAIN: 0
UNEXPECTED WEIGHT CHANGE: 0
SORE THROAT: 0
PALPITATIONS: 0
JOINT SWELLING: 0
WOUND: 0
SHORTNESS OF BREATH: 0
DIZZINESS: 0
HEADACHES: 0
DIARRHEA: 0
SINUS PAIN: 0
COUGH: 0
ARTHRALGIAS: 0
NERVOUS/ANXIOUS: 0
NAUSEA: 0
WEAKNESS: 0
CHILLS: 0
VOMITING: 0

## 2021-11-02 ENCOUNTER — TELEPHONE (OUTPATIENT)
Dept: ENDOCRINOLOGY | Facility: CLINIC | Age: 51
End: 2021-11-02

## 2021-11-02 NOTE — TELEPHONE ENCOUNTER
Received Ultrasound of Thyroid report from Family Medicine at [facility]; forwarded to Dr. Nuñez's MA.

## 2021-11-04 NOTE — TELEPHONE ENCOUNTER
Patient dropped off disk and report for Ultrasound Thyroid, for Dr. Nuñez to review before 11/5 appointment. Forwarded to doctor

## 2021-11-05 ENCOUNTER — OFFICE VISIT (OUTPATIENT)
Dept: ENDOCRINOLOGY | Facility: CLINIC | Age: 51
End: 2021-11-05
Payer: COMMERCIAL

## 2021-11-05 VITALS
WEIGHT: 110.6 LBS | RESPIRATION RATE: 14 BRPM | TEMPERATURE: 98.3 F | DIASTOLIC BLOOD PRESSURE: 68 MMHG | OXYGEN SATURATION: 99 % | BODY MASS INDEX: 18.43 KG/M2 | HEART RATE: 107 BPM | HEIGHT: 65 IN | SYSTOLIC BLOOD PRESSURE: 90 MMHG

## 2021-11-05 DIAGNOSIS — E05.90 HYPERTHYROIDISM: Primary | ICD-10-CM

## 2021-11-05 DIAGNOSIS — E04.1 THYROID NODULE: ICD-10-CM

## 2021-11-05 PROCEDURE — 99244 OFF/OP CNSLTJ NEW/EST MOD 40: CPT | Performed by: INTERNAL MEDICINE

## 2021-11-05 PROCEDURE — 3008F BODY MASS INDEX DOCD: CPT | Performed by: INTERNAL MEDICINE

## 2021-11-05 NOTE — PROGRESS NOTES
Emma Bojorquez is a 51 y.o. female who presents today for evaluation and management of hypothyroidism, thyroid nodule. Referred by Nguyen Barrera MD.     History  Early October she had her refrigerator leaked and was cleaning, which included some mold. 2 days later felt neck discomfort, fatigue. A week later noticed neck swelling and painful swallowing.  She was seen at Urgent care and diagnosed with hyperthyroidism.    She also had thyroid ultrasound which revealed 1.8 cm x 3.2 cm left thyroid nodule, heterogeneous thyroid, top normal lymph nodes    She took off this week and started to feel better, fatigue     Symptoms of hyperthyroidism  Recent unintentional weight loss last 2 weeks 4-5 #  Heat intolerance no  Increased stool frequency/diarrhea yes  Tremor some  Palpitations no  Anxiety no  Insomnia no  Vision changes npo  Menstrual changes yes  She denied dysphagia, shortness of breath    Prior thyroid disease no     History of a URI or other illness prior to thyroid studies being drawn no     History of an iodine contrast image no     History of any medication known to cause hyperthyroidism Lithium/Amiodarone no    Taking any iodine, kelp, or thyroid hormone support supplementation no  Taking biotin no    Cranberry for UTI. 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 No pertinent past medical history.  Past Surgical History:   Procedure Laterality Date   • APPENDECTOMY  1996     Family History   Problem Relation Age of Onset   • No Known Problems Biological Mother    • No Known Problems Biological Father      Current Outpatient Medications   Medication Sig Dispense Refill   • cranberry fruit extract (CRANBERRY ORAL) Take by mouth.     • acetaZOLAMIDE (DIAMOX) 125 mg tablet Take 1 tablet twice a day starting the day before ascent.  Continue 3 days afterwards.  (Patient not taking: Reported on 10/27/2021 ) 10 tablet 0     No current facility-administered medications for this visit.     No Known Allergies  Social History     Socioeconomic History   • Marital status:      Spouse name: None   • Number of children: None   • Years of education: None   • Highest education level: None   Occupational History   • None   Tobacco Use   • Smoking status: Never Smoker   • Smokeless tobacco: Never Used   Substance and Sexual Activity   • Alcohol use: No   • Drug use: No   • Sexual activity: None   Other Topics Concern   • None   Social History Narrative   • None     Social Determinants of Health     Financial Resource Strain:    • Difficulty of Paying Living Expenses: Not on file   Food Insecurity:    • Worried About Running Out of Food in the Last Year: Not on file   • Ran Out of Food in the Last Year: Not on file   Transportation Needs:    • Lack of Transportation (Medical): Not on file   • Lack of Transportation (Non-Medical): Not on file   Physical Activity:    • Days of Exercise per Week: Not on file   • Minutes of Exercise per Session: Not on file   Stress:    • Feeling of Stress : Not on file   Social Connections:    • Frequency of Communication with Friends and Family: Not on file   • Frequency of Social Gatherings with Friends and Family: Not on file   • Attends Protestant Services: Not on file   • Active Member of Clubs or Organizations: Not on file   • Attends Club or Organization Meetings: Not on file   • Marital Status: Not on file   Intimate Partner Violence:    • Fear of Current or Ex-Partner: Not on file   • Emotionally Abused: Not on file   • Physically Abused: Not on file   • Sexually Abused: Not on file   Housing Stability:    • Unable to Pay for Housing in the Last Year: Not on file   • Number of Places Lived in the Last Year: Not on file   • Unstable Housing in the Last Year: Not on file       ROS:  The complete review of systems is otherwise negative except  "as noted in HPI.      PHYSICAL EXAM:  Vitals:    11/05/21 1001   BP: 90/68   BP Location: Left upper arm   Patient Position: Sitting   Pulse: (!) 107   Resp: 14   Temp: 36.8 °C (98.3 °F)   TempSrc: Temporal   SpO2: 99%   Weight: 50.2 kg (110 lb 9.6 oz)   Height: 1.651 m (5' 5\")       Body mass index is 18.4 kg/m².    GENERAL: Well developed, well nourished, in no acute distress  EYES: Extraocular movements intact, conjunctiva no chemosis, no proptosis, no lid lag, retraction  NECK: Supple, nl anterior cervical lymphadenopathy, no supraclavicular fat pad  THYROID: thyroid palpable, enlarged, non tender on my exam today  CARDIOVASCULAR: Regular rate and Regular rhythm  RESPIRATORY: Symmetrical chest expansion, normal respiratory effort, clear to auscultation bilaterally  GASTROINTESTINAL: Soft, non tender  MUSCULOSKELETAL: no cyanosis, normal muscle mass, no edema in lower extremities  SKIN: Warm, dry, no lesions  NAILS: Normal, well manicured.  NEUROLOGIC: Awake, alert, tremors no    Outside records and notes: reviewed. Pertinent positives summarized in HPI    LABS: \10/25/21 -TSH 0.01, total T3 2.6(0.9 - 1.8), free T4 4.39 (0.58 - 1.64)    IMAGING:   Results for orders placed during the hospital encounter of 10/26/21    ULTRASOUND THYROID [[id number]]    Addendum 10/26/2021  3:54 PM  Emily ROSE faxed the report to [facility] on 10/26/21 at 3:15pm  and Damien confirmed receipt of the report and would give to a provider to  review.    Narrative  CLINICAL HISTORY: E04.9. R 53.83.    COMMENT:  Imaging of the thyroid gland is performed with a high frequency linear  transducer.    Comparison: None    The right lobe of the thyroid gland measures 4.2 x 2.2 x 2.1 cm and is  heterogeneous in echotexture.    The left lobe of the thyroid gland measures 4.6 x 2.0 x 1.9 cm and is  heterogeneous in echotexture.  Nodule # 1  Location:         Most of the left thyroid lobe  Size:                3.2 x 1.6 x 1.8 " cm  Margins:          Circumscribed  Shape:                Wider than tall  Echogenicity:   Heterogeneous, hypo and isoechoic  Calcifications:  None  Vascularity:     Internal flow is present  TI-RADS Category: 4    The isthmus measures 4 mm.    Multiple lymph nodes are identified inferior to the right thyroid lobe measuring  up to 1.2 x 0.9 x 0.9 cm.      --    Impression  1.  Heterogeneous thyroid gland with an apparent nodule which occupies most of  the left thyroid lobe, as above. This nodule meets TI-RADS criteria for biopsy.  2.  Top normal lymph nodes adjacent to the right thyroid lobe.    In accordance with [place] Act 112,  the patient will receive a letter notifying them  to follow up with their physician.    ----------------------------------------------------------------------------  TI-RADS recommendations from ACR White paper 2017  ---  TI-RADS categories:  TR 1(0 points): Benign.  Recommendation: No fine-needle aspiration.  TR 2 (2 points): Not suspicious.  Recommendation: No fine-needle aspiration.  TR 3 (3 points): Mildly suspicious.  Recommendations:  Follow-up ultrasound in 1 year for nodules 1.5 cm or larger  Biopsy for nodules 2.5 cm or larger  TR 4 (4-6 points): Moderately suspicious.  Recommendations:  Follow-up ultrasound in 1 year for nodules 1 cm or larger  Biopsy for nodules 1.5 cm or larger  TR 5 (7 or more points): Highly suspicious.  Follow-up ultrasound in 1 year for nodules 0.5 cm or larger  Biopsy for nodules 1 cm or larger         PATHOLOGY:    ASSESSMENT AND PLAN:  This is a 51 y.o. female here for consultation.    1.  Thyroid nodule, hyperthyroidism  Discussed possibilities thyroiditis/toxic nodule/graves disease.  Based on the history and improving symptoms likely thyroiditis    The pathophysiology of hyperthyroidism,  and treatment options were discussed including antithyroidal agents, radioactive iodine and surgery below.     Check TFTs, CBC , LFTs in a week  Will also check TSI,  Trab, TPO abs for underlying etiology.     Hold off on beta blocker BP 90/68  Check uptake scan.   Will inform pt about the results and discuss management pending results.     To avoid strenuous activity until thyroid is better controlled  Keep hydrated  To avoid Biotin and high-dose iron supplements as they can interfere with thyroid labs and thyroid function    Discussed if hyperfunctioning nodule, would not necessarily warrant biopsy as risk of malignancy is low, we will continue to monitor with follow-up ultrasound    Discussed medication side effects of PTU/ methimazole including but not limited to rash, joint pain, rare hepatotoxicity and agranulocytosis and skin rash.  The risk of agranulocytosis that can occur in 1 of 300 patients who are taking either PTU or methimazole. If patient develops sore throat, mouth ulcers or fever, advised to stop methimazole or PTU temporarily and to seek immediate care to have blood test CBC to r/o agranulocytosis and to restart only if CBC normal.  Discussed may go in remission with use of methimazole although the chances of this may be 20-30% after 2 yrs. We discussed that thionamides, especially methimazole, are teratogenic. Methimazole should not be used in the first trimester of pregnancy if applicable.  Discussed short term and long term side-effects/precautions regarding I-131 ablation including sialoadenitis, permanent hypothyroidism, rarely nausea, pregnancy contraindication for at least 6 month post ARELLANO treatment. The patient will need to stop methimazole 5 days prior to the I-131 therapy. The patient is also aware that there is a small risk of transiently becoming more hyperthyroid after the radioactive iodine therapy.  This will be assessed symptomatically and by measurement of thyroid function tests.  If hyperthyroidism is exacerbated, then methimazole therapy will be initiated.  Risk/benefits/indications of thyroidectomy discussed including permanent  hypothyroidism, damage to parathyroid glands and/or recurrent laryngeal nerve.    Discussed small risk of recurrence of hyperthyroidism with radioactive iodine or surgery and need for life long levothyroxine replacement.         I have answered all of my patient's questions to the best of my ability. To call us with any changes    Return to office in 2-3 weeks or earlier if issues arise     Orders Placed This Encounter   Procedures   • NUCLEAR MEDICINE THYROID UPTAKE AND SCAN   • TSH 3rd Generation   • T4, free   • T3   • CBC   • Comprehensive metabolic panel   • CBC   • Comprehensive metabolic panel   • TSH 3rd Generation   • T4, free   • T3   • TSI (Thyroid Stimulating Immunoglobulin)   • Thyroid peroxidase antibody   • THYROTROPIN RECEPTOR AB, SERUM       This note was sent to PCP    This patient has been dictated using speech recognition software. Inadvertent speech recognition errors should be disregarded. Please do not hesitate to call the office for clarification.

## 2021-11-05 NOTE — LETTER
November 5, 2021     Nguyen Barrera MD  [address] [facility] [address]    Patient: Emma Bojorquez  YOB: 1970  Date of Visit: 11/5/2021      Dear Dr. Barrera:    Thank you for referring Emma Bojorquez to me for evaluation. Below are my notes for this consultation.    If you have questions, please do not hesitate to call me. I look forward to following your patient along with you.         Sincerely,        Katia Nuñez MD        CC: No Recipients  Katia Nuñez MD  11/5/2021 11:35 AM  Signed  Emma Bojorquez is a 51 y.o. female who presents today for evaluation and management of hypothyroidism, thyroid nodule. Referred by Nguyen Barrera MD.     History  Early October she had her refrigerator leaked and was cleaning, which included some mold. 2 days later felt neck discomfort, fatigue. A week later noticed neck swelling and painful swallowing.  She was seen at Urgent care and diagnosed with hyperthyroidism.    She also had thyroid ultrasound which revealed 1.8 cm x 3.2 cm left thyroid nodule, heterogeneous thyroid, top normal lymph nodes    She took off this week and started to feel better, fatigue     Symptoms of hyperthyroidism  Recent unintentional weight loss last 2 weeks 4-5 #  Heat intolerance no  Increased stool frequency/diarrhea yes  Tremor some  Palpitations no  Anxiety no  Insomnia no  Vision changes npo  Menstrual changes yes  She denied dysphagia, shortness of breath    Prior thyroid disease no     History of a URI or other illness prior to thyroid studies being drawn no     History of an iodine contrast image no     History of any medication known to cause hyperthyroidism Lithium/Amiodarone no    Taking any iodine, kelp, or thyroid hormone support supplementation no  Taking biotin no    Cranberry for UTI. 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 No pertinent past medical history.  Past Surgical History:   Procedure Laterality Date   • APPENDECTOMY  1996     Family History   Problem Relation Age of Onset   • No Known Problems Biological Mother    • No Known Problems Biological Father      Current Outpatient Medications   Medication Sig Dispense Refill   • cranberry fruit extract (CRANBERRY ORAL) Take by mouth.     • acetaZOLAMIDE (DIAMOX) 125 mg tablet Take 1 tablet twice a day starting the day before ascent.  Continue 3 days afterwards. (Patient not taking: Reported on 10/27/2021 ) 10 tablet 0     No current facility-administered medications for this visit.     No Known Allergies  Social History     Socioeconomic History   • Marital status:      Spouse name: None   • Number of children: None   • Years of education: None   • Highest education level: None   Occupational History   • None   Tobacco Use   • Smoking status: Never Smoker   • Smokeless tobacco: Never Used   Substance and Sexual Activity   • Alcohol use: No   • Drug use: No   • Sexual activity: None   Other Topics Concern   • None   Social History Narrative   • None     Social Determinants of Health     Financial Resource Strain:    • Difficulty of Paying Living Expenses: Not on file   Food Insecurity:    • Worried About Running Out of Food in the Last Year: Not on file   • Ran Out of Food in the Last Year: Not on file   Transportation Needs:    • Lack of Transportation (Medical): Not on file   • Lack of Transportation (Non-Medical): Not on file   Physical Activity:    • Days of Exercise per Week: Not on file   • Minutes of Exercise per Session: Not on file   Stress:    • Feeling of Stress : Not on file   Social Connections:    • Frequency of Communication with Friends and Family: Not on file   • Frequency of Social Gatherings with Friends and Family: Not on file   • Attends Voodoo Services: Not on file   • Active Member of Clubs or Organizations: Not on  "file   • Attends Club or Organization Meetings: Not on file   • Marital Status: Not on file   Intimate Partner Violence:    • Fear of Current or Ex-Partner: Not on file   • Emotionally Abused: Not on file   • Physically Abused: Not on file   • Sexually Abused: Not on file   Housing Stability:    • Unable to Pay for Housing in the Last Year: Not on file   • Number of Places Lived in the Last Year: Not on file   • Unstable Housing in the Last Year: Not on file       ROS:  The complete review of systems is otherwise negative except as noted in HPI.      PHYSICAL EXAM:  Vitals:    11/05/21 1001   BP: 90/68   BP Location: Left upper arm   Patient Position: Sitting   Pulse: (!) 107   Resp: 14   Temp: 36.8 °C (98.3 °F)   TempSrc: Temporal   SpO2: 99%   Weight: 50.2 kg (110 lb 9.6 oz)   Height: 1.651 m (5' 5\")       Body mass index is 18.4 kg/m².    GENERAL: Well developed, well nourished, in no acute distress  EYES: Extraocular movements intact, conjunctiva no chemosis, no proptosis, no lid lag, retraction  NECK: Supple, nl anterior cervical lymphadenopathy, no supraclavicular fat pad  THYROID: thyroid palpable, enlarged, non tender on my exam today  CARDIOVASCULAR: Regular rate and Regular rhythm  RESPIRATORY: Symmetrical chest expansion, normal respiratory effort, clear to auscultation bilaterally  GASTROINTESTINAL: Soft, non tender  MUSCULOSKELETAL: no cyanosis, normal muscle mass, no edema in lower extremities  SKIN: Warm, dry, no lesions  NAILS: Normal, well manicured.  NEUROLOGIC: Awake, alert, tremors no    Outside records and notes: reviewed. Pertinent positives summarized in HPI    LABS: \10/25/21 -TSH 0.01, total T3 2.6(0.9 - 1.8), free T4 4.39 (0.58 - 1.64)    IMAGING:   Results for orders placed during the hospital encounter of 10/26/21    ULTRASOUND THYROID [[id number]]    Addendum 10/26/2021  3:54 PM  Emily ROSE faxed the report to [facility], [facility] on 10/26/21 at 3:15pm  and Damien confirmed receipt of " the report and would give to a provider to  review.    Narrative  CLINICAL HISTORY: E04.9. R 53.83.    COMMENT:  Imaging of the thyroid gland is performed with a high frequency linear  transducer.    Comparison: None    The right lobe of the thyroid gland measures 4.2 x 2.2 x 2.1 cm and is  heterogeneous in echotexture.    The left lobe of the thyroid gland measures 4.6 x 2.0 x 1.9 cm and is  heterogeneous in echotexture.  Nodule # 1  Location:         Most of the left thyroid lobe  Size:                3.2 x 1.6 x 1.8 cm  Margins:          Circumscribed  Shape:                Wider than tall  Echogenicity:   Heterogeneous, hypo and isoechoic  Calcifications:  None  Vascularity:     Internal flow is present  TI-RADS Category: 4    The isthmus measures 4 mm.    Multiple lymph nodes are identified inferior to the right thyroid lobe measuring  up to 1.2 x 0.9 x 0.9 cm.      --    Impression  1.  Heterogeneous thyroid gland with an apparent nodule which occupies most of  the left thyroid lobe, as above. This nodule meets TI-RADS criteria for biopsy.  2.  Top normal lymph nodes adjacent to the right thyroid lobe.    In accordance with [place] Act 112,  the patient will receive a letter notifying them  to follow up with their physician.    ----------------------------------------------------------------------------  TI-RADS recommendations from ACR White paper 2017  ---  TI-RADS categories:  TR 1(0 points): Benign.  Recommendation: No fine-needle aspiration.  TR 2 (2 points): Not suspicious.  Recommendation: No fine-needle aspiration.  TR 3 (3 points): Mildly suspicious.  Recommendations:  Follow-up ultrasound in 1 year for nodules 1.5 cm or larger  Biopsy for nodules 2.5 cm or larger  TR 4 (4-6 points): Moderately suspicious.  Recommendations:  Follow-up ultrasound in 1 year for nodules 1 cm or larger  Biopsy for nodules 1.5 cm or larger  TR 5 (7 or more points): Highly suspicious.  Follow-up ultrasound in 1 year for nodules  0.5 cm or larger  Biopsy for nodules 1 cm or larger         PATHOLOGY:    ASSESSMENT AND PLAN:  This is a 51 y.o. female here for consultation.    1.  Thyroid nodule, hyperthyroidism  Discussed possibilities thyroiditis/toxic nodule/graves disease.  Based on the history and improving symptoms likely thyroiditis    The pathophysiology of hyperthyroidism,  and treatment options were discussed including antithyroidal agents, radioactive iodine and surgery below.     Check TFTs, CBC , LFTs in a week  Will also check TSI, Trab, TPO abs for underlying etiology.     Hold off on beta blocker BP 90/68  Check uptake scan.   Will inform pt about the results and discuss management pending results.     To avoid strenuous activity until thyroid is better controlled  Keep hydrated  To avoid Biotin and high-dose iron supplements as they can interfere with thyroid labs and thyroid function    Discussed if hyperfunctioning nodule, would not necessarily warrant biopsy as risk of malignancy is low, we will continue to monitor with follow-up ultrasound    Discussed medication side effects of PTU/ methimazole including but not limited to rash, joint pain, rare hepatotoxicity and agranulocytosis and skin rash.  The risk of agranulocytosis that can occur in 1 of 300 patients who are taking either PTU or methimazole. If patient develops sore throat, mouth ulcers or fever, advised to stop methimazole or PTU temporarily and to seek immediate care to have blood test CBC to r/o agranulocytosis and to restart only if CBC normal.  Discussed may go in remission with use of methimazole although the chances of this may be 20-30% after 2 yrs. We discussed that thionamides, especially methimazole, are teratogenic. Methimazole should not be used in the first trimester of pregnancy if applicable.  Discussed short term and long term side-effects/precautions regarding I-131 ablation including sialoadenitis, permanent hypothyroidism, rarely nausea,  pregnancy contraindication for at least 6 month post ARELLANO treatment. The patient will need to stop methimazole 5 days prior to the I-131 therapy. The patient is also aware that there is a small risk of transiently becoming more hyperthyroid after the radioactive iodine therapy.  This will be assessed symptomatically and by measurement of thyroid function tests.  If hyperthyroidism is exacerbated, then methimazole therapy will be initiated.  Risk/benefits/indications of thyroidectomy discussed including permanent hypothyroidism, damage to parathyroid glands and/or recurrent laryngeal nerve.    Discussed small risk of recurrence of hyperthyroidism with radioactive iodine or surgery and need for life long levothyroxine replacement.         I have answered all of my patient's questions to the best of my ability. To call us with any changes    Return to office in 2-3 weeks or earlier if issues arise     Orders Placed This Encounter   Procedures   • NUCLEAR MEDICINE THYROID UPTAKE AND SCAN   • TSH 3rd Generation   • T4, free   • T3   • CBC   • Comprehensive metabolic panel   • CBC   • Comprehensive metabolic panel   • TSH 3rd Generation   • T4, free   • T3   • TSI (Thyroid Stimulating Immunoglobulin)   • Thyroid peroxidase antibody   • THYROTROPIN RECEPTOR AB, SERUM       This note was sent to PCP    This patient has been dictated using speech recognition software. Inadvertent speech recognition errors should be disregarded. Please do not hesitate to call the office for clarification.

## 2021-11-05 NOTE — PATIENT INSTRUCTIONS
Please call for imaging;  interventional radiology [phone]    Interventional radiology/nuclear medicine -[facility], [facility], [facility], [facility]    Have thyroid uptake scan  Have thyroid test in a week.      This page and its contents  are Copyright © 2018  the American Thyroid Association 1 ®  [email or web address]  WHAT IS THE THYROID GLAND?  The thyroid gland is a butterfly-shaped endocrine gland  that is normally located in the lower front of the neck.  The thyroid’s job is to make thyroid hormones, which  are secreted into the blood and then carried to every  tissue in the body. Thyroid hormone helps the body use  energy, stay warm and keep the brain, heart, muscles,  and other organs working as they should.  WHAT IS HYPERTHYROIDISM?  The term hyperthyroidism refers to any condition in  which there is too much thyroid hormone produced in  the body. In other words, the thyroid gland is overactive.  Another term that you might hear for this problem is  thyrotoxicosis, which refers to high thyroid hormone  levels in the blood stream, irrespective of their source.  WHAT ARE THE SYMPTOMS OF  HYPERTHYROIDISM?  Thyroid hormone plays a significant role in the pace  of many processes in the body. These processes are  called your metabolism. If there is too much thyroid  hormone, every function of the body tends to speed  up. It is not surprising then that some of the symptoms  of hyperthyroidism are nervousness, irritability,  increased sweating, heart racing, hand tremors,  anxiety, difficulty sleeping, thinning of your skin, fine  brittle hair and weakness in your muscles--especially  in the upper arms and thighs. You may have more  frequent bowel movements, but diarrhea is uncommon.  You may lose weight despite a good appetite and,  for women, menstrual flow may lighten and menstrual  periods may occur less often. Since hyperthyroidism  increases your metabolism, many individuals  initially  have a lot of energy. However, as the hyperthyroidism  continues, the body tends to break down, so being  tired is very common.  Hyperthyroidism usually begins slowly but in some  young patients these changes can be very abrupt.  At first, the symptoms may be mistaken for simple  nervousness due to stress. If you have been trying  to lose weight by dieting, you may be pleased with  your success until the hyperthyroidism, which has  quickened the weight loss, causes other problems.  Hyperthyroidism  In Graves’ Disease (also known as Basedow’s Disease),  which is the most common form of hyperthyroidism,  the eyes may look enlarged because the upper lids  are elevated. Sometimes, one or both eyes may bulge.  Some patients have swelling of the front of the neck from  an enlarged thyroid gland (a goiter).  WHAT CAUSES HYPERTHYROIDISM?  The most common cause (in more than 70% of  people) is overproduction of thyroid hormone by the  entire thyroid gland. This condition is also known as  Graves’ disease (see the Graves’ Disease brochure for  details). Graves’ disease is caused by antibodies in  the blood that turn on the thyroid and cause it to grow  and secrete too much thyroid hormone. This type of  hyperthyroidism tends to run in families and it occurs  more often in young women. Little is known about why  specific individuals get this disease. Another type  of hyperthyroidism is characterized by one or more  nodules or lumps in the thyroid that may gradually grow  and increase their activity so that the total output of  thyroid hormone into the blood is greater than normal.  This condition is known as toxic nodular or multinodular  goiter. Also, people may temporarily have symptoms  of hyperthyroidism if they have a condition called  thyroiditis. This condition is caused by a problem with  the immune system or a viral infection that causes  the gland to leak stored thyroid hormone. The same  symptoms can also  be caused by taking too much  thyroid hormone in tablet form. In these last two forms,  there is excess thyroid hormone but the thyroid is not  overactive.  HOW IS HYPERTHYROIDISM DIAGNOSED?  If your physician suspects that you have  hyperthyroidism, diagnosis is usually a simple matter.  A physical examination usually detects an enlarged  thyroid gland and a rapid pulse. The physician will also  look for moist, smooth skin and a tremor of your fingers.  Your reflexes are likely to be fast, and your eyes may  have some abnormalities if you have Graves’ disease.  AMERICAN THYROID ASSOCIATION®  This page and its contents  are Copyright © 2018  the American Thyroid Association 2 ®  [email or web address]  Hyperthyroidism  The diagnosis of hyperthyroidism will be confirmed  by laboratory tests that measure the amount of thyroid  hormones-- thyroxine (T4) and triiodothyronine  (T3)--and thyroid-stimulating hormone (TSH) in your  blood. A high level of thyroid hormone in the blood  plus a low level of TSH is common with an overactive  thyroid gland. If blood tests show that your thyroid is  overactive, your doctor may want to measure levels of  thyrotropin receptor antibodies (TRAbs), which when  elevated confirm the diagnosis of Graves disease. Your  doctor may also want to obtain a picture of your thyroid  (a thyroid scan). The scan will find out if your entire  thyroid gland is overactive or whether you have a toxic  nodular goiter or thyroiditis (thyroid inflammation). A test  that measures the ability of the gland to collect iodine (a  thyroid uptake) may be done at the same time.  HOW IS HYPERTHYROIDISM TREATED?  No single treatment is best for all patients with  hyperthyroidism. The appropriate choice of  treatment will be influenced by your age, the type of  hyperthyroidism that you have, the severity of your  hyperthyroidism, other medical conditions that may be  affecting your health, and your own preference. It  may  be a good idea to consult with an endocrinologist who  is experienced in the treatment of hyperthyroid patients.  If you are unconvinced or unclear about any thyroid  treatment plan, a second opinion is a good idea.  Antithyroid Drugs: Drugs known as antithyroid  agents--methimazole (Tapazole®) or in rare instances  propylthiouracil (PTU)--may be prescribed if your  doctor chooses to treat the hyperthyroidism by blocking  the thyroid gland’s ability to make new thyroid hormone.  Methimazole is presently the preferred one due to less  severe side-effects. These drugs work well to control  the overactive thyroid, and do not cause permanent  damage to the thyroid gland. In about 20% to 30% of  patients with Graves’ disease, treatment with antithyroid  drugs for a period of 12 to 18 months will result in  prolonged remission of the disease. For patients with  toxic nodular or multinodular goiter, antithyroid drugs  are sometimes used in preparation for either radioiodine  treatment or surgery.  Antithyroid drugs cause allergic reactions in about 5%  of patients who take them. Common minor reactions  are red skin rashes, hives, and occasionally fever and  joint pains. A rarer (occurring in 1 of 500 patients), but  more serious side effect is a decrease in the number  of white blood cells. Such a decrease can lower your  resistance to infection. Very rarely, these white blood  cells disappear completely, producing a condition known  as agranulocytosis, a potentially fatal problem if a serious  infection occurs. If you are taking one of these drugs  and develop a fever or sore throat, you should stop the  drug immediately and have a white blood cell count  that day. Even if the drug has lowered your white blood  cell count, the count will return to normal if the drug is  stopped immediately. But if you continue to take one of  these drugs in spite of a low white blood cell count, there  is a risk of a more serious,  even life-threatening infection.  Liver damage is another very rare side effect. A very  serious liver problem can occur with PTU use which is  why this medication should not generally be prescribed.  You should stop either methimazole or PTU and call your  doctor if you develop yellow eyes, dark urine, severe  fatigue, or abdominal pain.  Radioactive Iodine: Another way to treat  hyperthyroidism is to damage or destroy the thyroid  cells that make thyroid hormone. Because these cells  need iodine to make thyroid hormone, they will take  up any form of iodine in your bloodstream, whether it  is radioactive or not. The radioactive iodine used in  this treatment is administered by mouth, usually in a  small capsule that is taken just once. once swallowed,  the radioactive iodine gets into your bloodstream and  quickly is taken up by the overactive thyroid cells.  The radioactive iodine that is not taken up by the  thyroid cells disappears from the body within days  over a period of several weeks to several months  (during which time drug treatment may be used to  control hyperthyroid symptoms), radioactive iodine  destroys the cells that have taken it up. The result is  that the thyroid or thyroid nodules shrink in size, and  the level of thyroid hormone in the blood returns to  normal. Sometimes patients will remain hyperthyroid,  but usually to a lesser degree than before.  FURTHER INFORMATION  Further details on this and other thyroid-related topics are available in the patient thyroid  information section on the American Thyroid Association® website at [email or web address].  For information on thyroid patient support organizations, please visit the  Patient Support Links section on the [facility] website at [email or web address]  AMERICAN THYROID ASSOCIATION®  [email or web address]  Hyperthyroidism  This page and its contents  are Copyright © 2018  the American Thyroid Association 3 ®  For them, a second radioiodine treatment can  be  given if needed. More often, hypothyroidism (an  underactive thyroid) occurs after a few months and  lasts lifelong, requiring treatment. In fact, when  patients have Graves’ disease, a dose of radioactive  iodine is chosen with the goal of making the patient  hypothyroid so that the hyperthyroidism does not return  in the future. Hypothyroidism can easily be treated with  a thyroid hormone supplement taken once a day (see  Hypothyroidism brochure).  Radioactive iodine has been used to treat patients for  hyperthyroidism for over 60 years and has been shown  to be generally safe. Importantly, there has been no  clear increase in cancer in hyperthyroid patients that  have been treated with radioactive iodine. As a result,  in the [place] more than 70% of adults who  develop hyperthyroidism are treated with radioactive  iodine. More and more children over the age of 5 are  also being safely treated with radioiodine.  Surgery: Your hyperthyroidism can be permanently  cured by surgical removal of all or most of your thyroid  gland. This procedure is best performed by a surgeon  who has experience in thyroid surgery. An operation  could be risky unless your hyperthyroidism is first  controlled by an antithyroid drug (see above) or a  beta-blocking drug (see below), usually for some days  before surgery, your surgeon may want you to take  drops of nonradioactive iodine--either Lugol’s iodine  or supersaturated potassium iodide (SSKI). This extra  iodine reduces the blood supply to the thyroid gland  and thus makes the surgery easier and safer. Although  any surgery is risky, major complications of thyroid  surgery occur rarely in patients operated on by an  experienced thyroid surgeon. These complications  include damage to the parathyroid glands that are next  to the thyroid and control your body’s calcium levels  (causing problems with low calcium levels) and damage  to the nerves that control your vocal cords  (causing you  to have a hoarse voice).  After your thyroid gland is removed, the source of  your hyperthyroidism is gone and you will become  hypothyroid. As with hypothyroidism that develops after  radioiodine treatment, your thyroid hormone levels can  be restored to normal by treatment once a day with a  thyroid hormone supplement.  Beta-Blockers: No matter which of these three methods  of treatment are used for your hyperthyroidism, your  physician may prescribe a class of drugs known as  beta-blockers that block the action of thyroid hormone  on your body. They usually make you feel better within  hours to days, even though they do not change the  high levels of thyroid hormone in your blood. These  drugs may be extremely helpful in slowing down your  heart rate and reducing the symptoms of palpitations,  shakes, and nervousness until one of the other forms  of treatment has a chance to take effect. Propranolol  (Inderal®) was the first of these drugs to be developed.  Some physicians now prefer related, but longer-acting  beta-blocking drugs such as atenolol (Tenormin®),  metoprolol (Lopressor®), nadolol (Corgard®), and  Inderal-LA® because of their more convenient once- or  twice-a-day dosage.  OTHER FAMILY MEMBERS AT RISK  Because hyperthyroidism, especially Graves’ disease,  may run in families, examinations of the members of  your family may reveal other individuals with thyroid  problems.  FURTHER INFORMATION  Further details on this and other thyroid-related topics are available in the patient thyroid  information section on the American Thyroid Association® website at [email or web address].  For information on thyroid patient support organizations, please visit the  Patient Support Links section on the [facility] website at [email or web address]  [employer]OCIATION®

## 2021-11-15 LAB
ERYTHROCYTE [DISTWIDTH] IN BLOOD BY AUTOMATED COUNT: 11.6 % (ref 11.7–15.4)
HCT VFR BLD AUTO: 36.8 % (ref 34–46.6)
HGB BLD-MCNC: 12.4 G/DL (ref 11.1–15.9)
MCH RBC QN AUTO: 31.6 PG (ref 26.6–33)
MCHC RBC AUTO-ENTMCNC: 33.7 G/DL (ref 31.5–35.7)
MCV RBC AUTO: 94 FL (ref 79–97)
PLATELET # BLD AUTO: 363 X10E3/UL (ref 150–450)
RBC # BLD AUTO: 3.93 X10E6/UL (ref 3.77–5.28)
WBC # BLD AUTO: 5.7 X10E3/UL (ref 3.4–10.8)

## 2021-11-16 LAB
ALBUMIN SERPL-MCNC: 4 G/DL (ref 3.8–4.8)
ALBUMIN/GLOB SERPL: 1.7 {RATIO} (ref 1.2–2.2)
ALP SERPL-CCNC: 71 IU/L (ref 44–121)
ALT SERPL-CCNC: 24 IU/L (ref 0–32)
AST SERPL-CCNC: 21 IU/L (ref 0–40)
BILIRUB SERPL-MCNC: 0.3 MG/DL (ref 0–1.2)
BUN SERPL-MCNC: 9 MG/DL (ref 6–24)
BUN/CREAT SERPL: 18 (ref 9–23)
CALCIUM SERPL-MCNC: 9.5 MG/DL (ref 8.7–10.2)
CHLORIDE SERPL-SCNC: 101 MMOL/L (ref 96–106)
CHOLEST SERPL-MCNC: 171 MG/DL (ref 100–199)
CO2 SERPL-SCNC: 25 MMOL/L (ref 20–29)
CREAT SERPL-MCNC: 0.51 MG/DL (ref 0.57–1)
GLOBULIN SER CALC-MCNC: 2.4 G/DL (ref 1.5–4.5)
GLUCOSE SERPL-MCNC: 85 MG/DL (ref 65–99)
HDLC SERPL-MCNC: 57 MG/DL
LAB CORP EGFR IF AFRICN AM: 131 ML/MIN/1.73
LAB CORP EGFR IF NONAFRICN AM: 113 ML/MIN/1.73
LDLC SERPL CALC-MCNC: 92 MG/DL (ref 0–99)
POTASSIUM SERPL-SCNC: 4.2 MMOL/L (ref 3.5–5.2)
PROT SERPL-MCNC: 6.4 G/DL (ref 6–8.5)
SODIUM SERPL-SCNC: 137 MMOL/L (ref 134–144)
T3 SERPL-MCNC: 101 NG/DL (ref 71–180)
T4 FREE SERPL-MCNC: 1.37 NG/DL (ref 0.82–1.77)
THYROPEROXIDASE AB SERPL-ACNC: <8 IU/ML (ref 0–34)
TRIGL SERPL-MCNC: 125 MG/DL (ref 0–149)
TSH SERPL DL<=0.005 MIU/L-ACNC: 0.01 UIU/ML (ref 0.45–4.5)
VLDLC SERPL CALC-MCNC: 22 MG/DL (ref 5–40)

## 2021-11-17 ENCOUNTER — HOSPITAL ENCOUNTER (OUTPATIENT)
Dept: RADIOLOGY | Facility: HOSPITAL | Age: 51
Setting detail: NUCLEAR MEDICINE
Discharge: HOME | End: 2021-11-17
Attending: INTERNAL MEDICINE
Payer: COMMERCIAL

## 2021-11-17 ENCOUNTER — TELEPHONE (OUTPATIENT)
Dept: FAMILY MEDICINE | Facility: CLINIC | Age: 51
End: 2021-11-17

## 2021-11-17 DIAGNOSIS — E05.90 HYPERTHYROIDISM: ICD-10-CM

## 2021-11-17 LAB
TSH RECEP AB SER-ACNC: <1.1 IU/L (ref 0–1.75)
TSI SER-ACNC: <0.1 IU/L (ref 0–0.55)

## 2021-11-17 PROCEDURE — A9516 IODINE I-123 SOD IODIDE MIC: HCPCS | Performed by: INTERNAL MEDICINE

## 2021-11-17 PROCEDURE — G1004 CDSM NDSC: HCPCS

## 2021-11-17 RX ADMIN — SODIUM IODIDE I 123 291.7 MICROCURIE: 200 CAPSULE, GELATIN COATED ORAL at 10:43

## 2021-11-22 ENCOUNTER — OFFICE VISIT (OUTPATIENT)
Dept: ENDOCRINOLOGY | Facility: CLINIC | Age: 51
End: 2021-11-22
Payer: COMMERCIAL

## 2021-11-22 VITALS
OXYGEN SATURATION: 98 % | BODY MASS INDEX: 18.99 KG/M2 | HEIGHT: 65 IN | SYSTOLIC BLOOD PRESSURE: 82 MMHG | HEART RATE: 87 BPM | DIASTOLIC BLOOD PRESSURE: 62 MMHG | WEIGHT: 114 LBS | TEMPERATURE: 98.2 F | RESPIRATION RATE: 14 BRPM

## 2021-11-22 DIAGNOSIS — E04.1 THYROID NODULE: ICD-10-CM

## 2021-11-22 DIAGNOSIS — E05.90 HYPERTHYROIDISM: Primary | ICD-10-CM

## 2021-11-22 DIAGNOSIS — E06.9 THYROIDITIS: ICD-10-CM

## 2021-11-22 PROCEDURE — 99214 OFFICE O/P EST MOD 30 MIN: CPT | Performed by: INTERNAL MEDICINE

## 2021-11-22 PROCEDURE — 3008F BODY MASS INDEX DOCD: CPT | Performed by: INTERNAL MEDICINE

## 2021-11-22 NOTE — PATIENT INSTRUCTIONS
If you have not heard from us about your lab or radiology results in 2 weeks after you have had them, please call our office and let us know. Please be aware that certain tests may take longer to come back, and we are not responsible for ordered tests that are not sent to us.

## 2021-11-22 NOTE — PROGRESS NOTES
Emma Bojorquez is a 51 y.o. female who presents today for evaluation and management of hypothyroidism, thyroid nodule. Referred by Nguyen Barrera MD.     11/22/21  Interim she is feeling better no medical issues.  She is 95% close to her baseline  She denied heat, cold intolerance, constipation, diarrhea, palpitations, anxiety, tremors  Occasionally feels her heart beat  Her baseline blood pressure 90/ 60  She denied dizziness, lightheadedness  She feels neck swelling has improved    History  Early October she had her refrigerator leaked and was cleaning, which included some mold. 2 days later felt neck discomfort, fatigue. A week later noticed neck swelling and painful swallowing.  She was seen at Urgent care and diagnosed with hyperthyroidism.    She also had thyroid ultrasound which revealed 1.8 cm x 3.2 cm left thyroid nodule, heterogeneous thyroid, top normal lymph nodes    She took off this week and started to feel better, fatigue     Symptoms of hyperthyroidism  Recent unintentional weight loss last 2 weeks 4-5 #  Heat intolerance no  Increased stool frequency/diarrhea yes  Tremor some  Palpitations no  Anxiety no  Insomnia no  Vision changes no  Menstrual changes yes  She denied dysphagia, shortness of breath    Back ache when long standing    Prior thyroid disease no     History of a URI or other illness prior to thyroid studies being drawn no     History of an iodine contrast image no     History of any medication known to cause hyperthyroidism Lithium/Amiodarone no    Taking any iodine, kelp, or thyroid hormone support supplementation no  Taking biotin no    Cranberry for UTI. 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 No pertinent past medical history.  Past Surgical History:   Procedure Laterality Date   • APPENDECTOMY  1996     Family History    Problem Relation Age of Onset   • No Known Problems Biological Mother    • No Known Problems Biological Father      Current Outpatient Medications   Medication Sig Dispense Refill   • cranberry fruit extract (CRANBERRY ORAL) Take by mouth.     • acetaZOLAMIDE (DIAMOX) 125 mg tablet Take 1 tablet twice a day starting the day before ascent.  Continue 3 days afterwards. (Patient not taking: Reported on 10/27/2021 ) 10 tablet 0     No current facility-administered medications for this visit.     No Known Allergies  Social History     Socioeconomic History   • Marital status:      Spouse name: None   • Number of children: None   • Years of education: None   • Highest education level: None   Occupational History   • None   Tobacco Use   • Smoking status: Never Smoker   • Smokeless tobacco: Never Used   Substance and Sexual Activity   • Alcohol use: No   • Drug use: No   • Sexual activity: None   Other Topics Concern   • None   Social History Narrative   • None     Social Determinants of Health     Financial Resource Strain:    • Difficulty of Paying Living Expenses: Not on file   Food Insecurity:    • Worried About Running Out of Food in the Last Year: Not on file   • Ran Out of Food in the Last Year: Not on file   Transportation Needs:    • Lack of Transportation (Medical): Not on file   • Lack of Transportation (Non-Medical): Not on file   Physical Activity:    • Days of Exercise per Week: Not on file   • Minutes of Exercise per Session: Not on file   Stress:    • Feeling of Stress : Not on file   Social Connections:    • Frequency of Communication with Friends and Family: Not on file   • Frequency of Social Gatherings with Friends and Family: Not on file   • Attends Yazdanism Services: Not on file   • Active Member of Clubs or Organizations: Not on file   • Attends Club or Organization Meetings: Not on file   • Marital Status: Not on file   Intimate Partner Violence:    • Fear of Current or Ex-Partner: Not on  "file   • Emotionally Abused: Not on file   • Physically Abused: Not on file   • Sexually Abused: Not on file   Housing Stability:    • Unable to Pay for Housing in the Last Year: Not on file   • Number of Places Lived in the Last Year: Not on file   • Unstable Housing in the Last Year: Not on file       ROS:  The complete review of systems is otherwise negative except as noted in HPI.      PHYSICAL EXAM:  Vitals:    11/22/21 1005 11/22/21 1010   BP: 90/60 (!) 82/62   BP Location: Left upper arm Right upper arm   Patient Position: Sitting    Pulse: 87    Resp: 14    Temp: 36.8 °C (98.2 °F)    TempSrc: Temporal    SpO2: 98%    Weight: 51.7 kg (114 lb)    Height: 1.651 m (5' 5\")        Body mass index is 18.97 kg/m².    GENERAL: Well developed, well nourished, in no acute distress  EYES: Extraocular movements intact, conjunctiva no chemosis, no proptosis, no lid lag, retraction  NECK: Supple, nl anterior cervical lymphadenopathy, no supraclavicular fat pad  THYROID: thyroid small, left nodule barely palpabale, non tender on my exam today  CARDIOVASCULAR: Regular rate and Regular rhythm  RESPIRATORY: Symmetrical chest expansion, normal respiratory effort, clear to auscultation bilaterally  GASTROINTESTINAL: Soft, non tender  MUSCULOSKELETAL: no cyanosis, normal muscle mass, no edema in lower extremities  SKIN: Warm, dry, no lesions  NAILS: Normal, well manicured.  NEUROLOGIC: Awake, alert, tremors no    Outside records and notes: reviewed. Pertinent positives summarized in HPI    LABS: \10/25/21 -TSH 0.01, total T3 2.6(0.9 - 1.8), free T4 4.39 (0.58 - 1.64)    Results for JAY BENITEZ (MRN [id number]) as of 11/22/2021 10:02   Ref. Range 11/15/2021 08:10   Chloride Latest Ref Range: 96 - 106 mmol/L 101   CO2 Latest Ref Range: 20 - 29 mmol/L 25   BUN Latest Ref Range: 6 - 24 mg/dL 9   Creatinine Latest Ref Range: 0.57 - 1.00 mg/dL 0.51 (L)   Glucose Latest Ref Range: 65 - 99 mg/dL 85   Calcium Latest Ref Range: 8.7 - 10.2 " mg/dL 9.5   AST (SGOT) Latest Ref Range: 0 - 40 IU/L 21   ALT (SGPT) Latest Ref Range: 0 - 32 IU/L 24   Alkaline Phosphatase Latest Ref Range: 44 - 121 IU/L 71   Total Protein Latest Ref Range: 6.0 - 8.5 g/dL 6.4   Albumin Latest Ref Range: 3.8 - 4.8 g/dL 4.0   Bilirubin, Total Latest Ref Range: 0.0 - 1.2 mg/dL 0.3   eGFR Latest Ref Range: >59 mL/min/1.73 113   Bun/Creatinine Ratio Latest Ref Range: 9 - 23  18   Globulin Latest Ref Range: 1.5 - 4.5 g/dL 2.4   TSH Latest Ref Range: 0.450 - 4.500 uIU/mL 0.009 (L)   Free T4 Latest Ref Range: 0.82 - 1.77 ng/dL 1.37   Results for JAY BENITEZ (MRN [id number]) as of 11/22/2021 10:02   Ref. Range 11/15/2021 08:11   TSI Latest Ref Range: 0.00 - 0.55 IU/L <0.10   Results for JAY BENITEZ (MRN [id number]) as of 11/22/2021 10:02   Ref. Range 11/15/2021 08:11   Thyroid Peroxidase (TPO) Ab Latest Ref Range: 0 - 34 IU/mL <8   Thyrotropin Receptor Ab, Serum Latest Ref Range: 0.00 - 1.75 IU/L <1.10   VLDL Cholesterol Dany Latest Ref Range: 5 - 40 mg/dL 22   Results for JAY BENITEZ (MRN [id number]) as of 11/22/2021 10:02   Ref. Range 11/15/2021 08:10   Triiodothyronine (T3) Latest Ref Range: 71 - 180 ng/dL 101       IMAGING: I personally reviewed images very low uptake and no thyroid tissue visible  Narrative & Impression   CLINICAL HISTORY: Hyperthyroidism, evaluate for hyperfunctioning nodule     COMMENT: Following oral administration of 291 uCi of oral I-123, thyroid imaging  and uptake obtained. 4 hour uptake 0.8% 24 hour uptake 0.2%. This is extremely  low uptake. Imaging is unreliable as there is no thyroid tissue visible with  this low uptake.     --  IMPRESSION: Imaging portion unreliable in the presence of extremely low uptake.  No thyroid tissue is visualized. Severely decreased uptake as discussed           Results for orders placed during the hospital encounter of 10/26/21    ULTRASOUND THYROID [[id number]]    Addendum 10/26/2021  3:54 PM  Emilysusana ROSE faxed the report to [facility] on 10/26/21 at 3:15pm  and Damien confirmed receipt of the report and would give to a provider to  review.    Narrative  CLINICAL HISTORY: E04.9. R 53.83.    COMMENT:  Imaging of the thyroid gland is performed with a high frequency linear  transducer.    Comparison: None    The right lobe of the thyroid gland measures 4.2 x 2.2 x 2.1 cm and is  heterogeneous in echotexture.    The left lobe of the thyroid gland measures 4.6 x 2.0 x 1.9 cm and is  heterogeneous in echotexture.  Nodule # 1  Location:         Most of the left thyroid lobe  Size:                3.2 x 1.6 x 1.8 cm  Margins:          Circumscribed  Shape:                Wider than tall  Echogenicity:   Heterogeneous, hypo and isoechoic  Calcifications:  None  Vascularity:     Internal flow is present  TI-RADS Category: 4    The isthmus measures 4 mm.    Multiple lymph nodes are identified inferior to the right thyroid lobe measuring  up to 1.2 x 0.9 x 0.9 cm.      --    Impression  1.  Heterogeneous thyroid gland with an apparent nodule which occupies most of  the left thyroid lobe, as above. This nodule meets TI-RADS criteria for biopsy.  2.  Top normal lymph nodes adjacent to the right thyroid lobe.    In accordance with [place] Act 112,  the patient will receive a letter notifying them  to follow up with their physician.    ----------------------------------------------------------------------------  TI-RADS recommendations from ACR White paper 2017  ---  TI-RADS categories:  TR 1(0 points): Benign.  Recommendation: No fine-needle aspiration.  TR 2 (2 points): Not suspicious.  Recommendation: No fine-needle aspiration.  TR 3 (3 points): Mildly suspicious.  Recommendations:  Follow-up ultrasound in 1 year for nodules 1.5 cm or larger  Biopsy for nodules 2.5 cm or larger  TR 4 (4-6 points): Moderately suspicious.  Recommendations:  Follow-up ultrasound in 1 year for nodules 1 cm or larger  Biopsy for nodules 1.5 cm or larger  TR 5 (7  or more points): Highly suspicious.  Follow-up ultrasound in 1 year for nodules 0.5 cm or larger  Biopsy for nodules 1 cm or larger         PATHOLOGY:    ASSESSMENT AND PLAN:  This is a 51 y.o. female here for consultation.    1.  Thyroid nodule, hyperthyroidism from thyroiditis  Work-up revealed thyroiditis    Follow-up thyroid function tests T3-T4 are normal, TSH suppressed discussed lag effect    Antibodies Graves' disease, Hashimoto's negative    NM uptake scan showed extremely low uptake and thyroid not visible 4 hour uptake 0.8% 24 hour uptake 0.2%    We discussed triphasic course of thyroiditis    Check TFTs in a month, sooner if symptomatic, reviewed symptoms    Left thyroid nodule now barely palpable, review of images show a 3 cm left thyroid nodule occupying most of left lobe, its possible a pseudonodule     Repeat thyroid ultrasound in a month.  She is a believer of no medications unless absolutely necessary and she prefers this.        I have answered all of my patient's questions to the best of my ability. To call us with any changes    Return to office in 1 month to review results or earlier if issues arise     Orders Placed This Encounter   Procedures   • ULTRASOUND THYROID   • TSH 3rd Generation   • T4, free   • T3       This note was sent to PCP    This patient has been dictated using speech recognition software. Inadvertent speech recognition errors should be disregarded. Please do not hesitate to call the office for clarification.

## 2021-12-08 ENCOUNTER — TELEPHONE (OUTPATIENT)
Dept: ENDOCRINOLOGY | Facility: CLINIC | Age: 51
End: 2021-12-08
Payer: COMMERCIAL

## 2021-12-21 ENCOUNTER — HOSPITAL ENCOUNTER (OUTPATIENT)
Dept: RADIOLOGY | Age: 51
Discharge: HOME | End: 2021-12-21
Attending: INTERNAL MEDICINE
Payer: COMMERCIAL

## 2021-12-21 DIAGNOSIS — E05.90 HYPERTHYROIDISM: ICD-10-CM

## 2021-12-21 PROCEDURE — 76536 US EXAM OF HEAD AND NECK: CPT

## 2022-03-06 LAB
T3 SERPL-MCNC: 85 NG/DL (ref 71–180)
T4 FREE SERPL-MCNC: 1.17 NG/DL (ref 0.82–1.77)
TSH SERPL DL<=0.005 MIU/L-ACNC: 7.22 UIU/ML (ref 0.45–4.5)

## 2022-03-18 ENCOUNTER — OFFICE VISIT (OUTPATIENT)
Dept: ENDOCRINOLOGY | Facility: CLINIC | Age: 52
End: 2022-03-18
Payer: COMMERCIAL

## 2022-03-18 VITALS
HEIGHT: 65 IN | DIASTOLIC BLOOD PRESSURE: 66 MMHG | HEART RATE: 85 BPM | SYSTOLIC BLOOD PRESSURE: 110 MMHG | WEIGHT: 117.6 LBS | TEMPERATURE: 98 F | RESPIRATION RATE: 16 BRPM | OXYGEN SATURATION: 97 % | BODY MASS INDEX: 19.59 KG/M2

## 2022-03-18 DIAGNOSIS — E03.8 SUBCLINICAL HYPOTHYROIDISM: ICD-10-CM

## 2022-03-18 DIAGNOSIS — E06.9 THYROIDITIS: ICD-10-CM

## 2022-03-18 DIAGNOSIS — E04.1 THYROID NODULE: Primary | ICD-10-CM

## 2022-03-18 PROCEDURE — 3008F BODY MASS INDEX DOCD: CPT | Performed by: INTERNAL MEDICINE

## 2022-03-18 PROCEDURE — 99214 OFFICE O/P EST MOD 30 MIN: CPT | Performed by: INTERNAL MEDICINE

## 2022-03-18 NOTE — PROGRESS NOTES
Emma Bojorquez is a 51 y.o. female who presents today for evaluation and management of hypothyroidism, thyroid nodule. Referred by Nguyen Barrera MD.     03/18/22  she is feeling better no medical issues.   She denied heat, cold intolerance, constipation, diarrhea, anxiety, tremors  When stressed occasionally feels her heart beat  neck swelling has improved, no compression symtpoms    History  Early October she had her refrigerator leaked and was cleaning, which included some mold. 2 days later felt neck discomfort, fatigue. A week later noticed neck swelling and painful swallowing.  She was seen at Urgent care and diagnosed with hyperthyroidism.    She also had thyroid ultrasound which revealed 1.8 cm x 3.2 cm left thyroid nodule, heterogeneous thyroid, top normal lymph nodes    She took off this week and started to feel better, fatigue     Symptoms of hyperthyroidism  Recent unintentional weight loss last 2 weeks 4-5 #  Heat intolerance no  Increased stool frequency/diarrhea yes  Tremor some  Palpitations no  Anxiety no  Insomnia no  Vision changes no  Menstrual changes yes  She denied dysphagia, shortness of breath    Back ache when long standing    Prior thyroid disease no     History of a URI or other illness prior to thyroid studies being drawn no     History of an iodine contrast image no     History of any medication known to cause hyperthyroidism Lithium/Amiodarone no    Taking any iodine, kelp, or thyroid hormone support supplementation no  Taking biotin no    Cranberry for UTI. 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     • cranberry fruit extract (CRANBERRY ORAL) Take by mouth.       No current facility-administered medications for this visit.     No Known Allergies  Social History     Socioeconomic History   • Marital status:      Spouse name: None   • Number of children: None   • Years of education: None   • Highest education level: None   Occupational History   • None   Tobacco Use   • Smoking status: Never Smoker   • Smokeless tobacco: Never Used   Substance and Sexual Activity   • Alcohol use: No   • Drug use: No   • Sexual activity: None   Other Topics Concern   • None   Social History Narrative   • None     Social Determinants of Health     Financial Resource Strain: Not on file   Food Insecurity: Not on file   Transportation Needs: Not on file   Physical Activity: Not on file   Stress: Not on file   Social Connections: Not on file   Intimate Partner Violence: Not on file   Housing Stability: Not on file       ROS:  The complete review of systems is otherwise negative except as noted in HPI.      PHYSICAL EXAM:  Vitals:    03/18/22 1305   BP: 110/66   BP Location: Left upper arm   Patient Position: Sitting   Pulse: 85   Resp: 16   Temp: 36.7 °C (98 °F)   TempSrc: Temporal   SpO2: 97%   Weight: 53.3 kg (117 lb 9.6 oz)   Height: 1.651 m (5' 5\")       Body mass index is 19.57 kg/m².    GENERAL: Well developed, well nourished, in no acute distress  EYES: Extraocular movements intact, conjunctiva no chemosis, no proptosis, no lid lag, retraction  NECK: Supple, nl anterior cervical lymphadenopathy, no supraclavicular fat pad  THYROID: thyroid small, left nodule barely palpabale, non tender on my exam today  CARDIOVASCULAR: Regular rate and Regular rhythm  RESPIRATORY: Symmetrical chest expansion, normal respiratory effort, clear to auscultation bilaterally  GASTROINTESTINAL: Soft, non " tender  MUSCULOSKELETAL: no cyanosis, normal muscle mass, no edema in lower extremities  SKIN: Warm, dry, no lesions  NAILS: Normal, well manicured.  NEUROLOGIC: Awake, alert, tremors no, DTR 2+    Outside records and notes: reviewed. Pertinent positives summarized in HPI    LABS:   3/5/22 -TSH 7.22, free T4 1.17, total T3 85  10/25/21 -TSH 0.01, total T3 2.6(0.9 - 1.8), free T4 4.39 (0.58 - 1.64)    Results for JAY BENITEZ (MRN [id number]) as of 11/22/2021 10:02   Ref. Range 11/15/2021 08:10   Chloride Latest Ref Range: 96 - 106 mmol/L 101   CO2 Latest Ref Range: 20 - 29 mmol/L 25   BUN Latest Ref Range: 6 - 24 mg/dL 9   Creatinine Latest Ref Range: 0.57 - 1.00 mg/dL 0.51 (L)   Glucose Latest Ref Range: 65 - 99 mg/dL 85   Calcium Latest Ref Range: 8.7 - 10.2 mg/dL 9.5   AST (SGOT) Latest Ref Range: 0 - 40 IU/L 21   ALT (SGPT) Latest Ref Range: 0 - 32 IU/L 24   Alkaline Phosphatase Latest Ref Range: 44 - 121 IU/L 71   Total Protein Latest Ref Range: 6.0 - 8.5 g/dL 6.4   Albumin Latest Ref Range: 3.8 - 4.8 g/dL 4.0   Bilirubin, Total Latest Ref Range: 0.0 - 1.2 mg/dL 0.3   eGFR Latest Ref Range: >59 mL/min/1.73 113   Bun/Creatinine Ratio Latest Ref Range: 9 - 23  18   Globulin Latest Ref Range: 1.5 - 4.5 g/dL 2.4   TSH Latest Ref Range: 0.450 - 4.500 uIU/mL 0.009 (L)   Free T4 Latest Ref Range: 0.82 - 1.77 ng/dL 1.37   Results for JAY BENITEZ (MRN [id number]) as of 11/22/2021 10:02   Ref. Range 11/15/2021 08:11   TSI Latest Ref Range: 0.00 - 0.55 IU/L <0.10   Results for JAY BENITEZ (MRN [id number]) as of 11/22/2021 10:02   Ref. Range 11/15/2021 08:11   Thyroid Peroxidase (TPO) Ab Latest Ref Range: 0 - 34 IU/mL <8   Thyrotropin Receptor Ab, Serum Latest Ref Range: 0.00 - 1.75 IU/L <1.10   VLDL Cholesterol Dany Latest Ref Range: 5 - 40 mg/dL 22   Results for JAY BENITEZ (MRN [id number]) as of 11/22/2021 10:02   Ref. Range 11/15/2021 08:10   Triiodothyronine (T3) Latest Ref Range: 71 - 180 ng/dL 101        IMAGING:  Results for orders placed during the hospital encounter of 12/21/21    ULTRASOUND THYROID [[id number]]    Narrative  CLINICAL HISTORY:    COMMENT:  Real time ultrasound of the thyroid gland was performed, including  color flow interrogation.    RIGHT LOBE  3.7 x 1.4 x 1.5 cm. Homogeneous echogenicity without focal mass. Less  heterogeneous than seen previously.    LEFT LOBE  4.3 x 1.2 x 1.5 cm. Single mass.  1. 2.1 x 0.7 x 1.2 cm (previously 3.2 x 1.6 x 1.8 cm). Smoothly circumscribed,  wider than tall, isoechoic, no microcalcifications. Less heterogeneous than seen  previously, and decreasing in size.    ISTHMUS  0.3 cm.    Normal-appearing nodes are seen in the neck bilaterally. The node on the right  measures 0.6 x 0.3 x 0.4 cm and is normal in appearance (previously 1.2 x 0.9 x  0.90). The node on the left is normal in size and appearance, measuring 1.3 x  0.3 x 1.1 cm      --    Impression  1. 2.1 cm left thyroid nodule which has significantly decreased in size and  become less heterogeneous when compared with 10/26/2021 ultrasound.    ----------------------------------------------------------------------------  TI-RADS recommendations from ACR White paper 2017  ---  TI-RADS categories:  TR 1(0 points): Benign.  Recommendation: No fine-needle aspiration.  TR 2 (2 points): Not suspicious.  Recommendation: No fine-needle aspiration.  TR 3 (3 points): Mildly suspicious.  Recommendations:  Follow-up ultrasound in 1 year for nodules 1.5 cm or larger  Biopsy for nodules 2.5 cm or larger  TR 4 (4-6 points): Moderately suspicious.  Recommendations:  Follow-up ultrasound in 1 year for nodules 1 cm or larger  Biopsy for nodules 1.5 cm or larger  TR 5 (7 or more points): Highly suspicious.  Follow-up ultrasound in 1 year for nodules 0.5 cm or larger  Biopsy for nodules 1 cm or larger  TI-RADS recommendations from ACR White paper 2017  Composition:  Cystic or almost completely cystic = 0 points  Spongiform = 0  points  Mixed cystic and solid = 1 point  Solid or almost completely solid = 2 points  -  Echogenicity:  Anechoic = 0 points  Hyperechoic or isoechoic = 1 point  Hypoechoic = 2 points  Very hypoechoic = 3 points  -  Shape:  Wider than tall = 0 point  Taller than wide = 3 points  -  Margin:  Smooth = 0 point  Ill-defined = 0 point  Lobulated/irregular = 2 points  Extrathyroidal extension = 3 points  -  Echogenic foci:  None or large comet tail artifact = 0 points  macro-calcification = 1 point  Peripheral/rim calcifications = 2 points  Punctate echogenic foci = 3 points         Narrative & Impression   CLINICAL HISTORY: Hyperthyroidism, evaluate for hyperfunctioning nodule     COMMENT: Following oral administration of 291 uCi of oral I-123, thyroid imaging  and uptake obtained. 4 hour uptake 0.8% 24 hour uptake 0.2%. This is extremely  low uptake. Imaging is unreliable as there is no thyroid tissue visible with  this low uptake.     --  IMPRESSION: Imaging portion unreliable in the presence of extremely low uptake.  No thyroid tissue is visualized. Severely decreased uptake as discussed           Results for orders placed during the hospital encounter of 10/26/21    ULTRASOUND THYROID [[id number]]    Addendum 10/26/2021  3:54 PM  Emily CAMILO. [facility] on 10/26/21 at 3:15pm  and Damien confirmed receipt of the report and would give to a provider to  review.    Narrative  CLINICAL HISTORY: E04.9. R 53.83.    COMMENT:  Imaging of the thyroid gland is performed with a high frequency linear  transducer.    Comparison: None    The right lobe of the thyroid gland measures 4.2 x 2.2 x 2.1 cm and is  heterogeneous in echotexture.    The left lobe of the thyroid gland measures 4.6 x 2.0 x 1.9 cm and is  heterogeneous in echotexture.  Nodule # 1  Location:         Most of the left thyroid lobe  Size:                3.2 x 1.6 x 1.8 cm  Margins:          Circumscribed  Shape:                Wider  than tall  Echogenicity:   Heterogeneous, hypo and isoechoic  Calcifications:  None  Vascularity:     Internal flow is present  TI-RADS Category: 4    The isthmus measures 4 mm.    Multiple lymph nodes are identified inferior to the right thyroid lobe measuring  up to 1.2 x 0.9 x 0.9 cm.      --    Impression  1.  Heterogeneous thyroid gland with an apparent nodule which occupies most of  the left thyroid lobe, as above. This nodule meets TI-RADS criteria for biopsy.  2.  Top normal lymph nodes adjacent to the right thyroid lobe.    In accordance with [place] Act 112,  the patient will receive a letter notifying them  to follow up with their physician.    ----------------------------------------------------------------------------  TI-RADS recommendations from ACR White paper 2017  ---  TI-RADS categories:  TR 1(0 points): Benign.  Recommendation: No fine-needle aspiration.  TR 2 (2 points): Not suspicious.  Recommendation: No fine-needle aspiration.  TR 3 (3 points): Mildly suspicious.  Recommendations:  Follow-up ultrasound in 1 year for nodules 1.5 cm or larger  Biopsy for nodules 2.5 cm or larger  TR 4 (4-6 points): Moderately suspicious.  Recommendations:  Follow-up ultrasound in 1 year for nodules 1 cm or larger  Biopsy for nodules 1.5 cm or larger  TR 5 (7 or more points): Highly suspicious.  Follow-up ultrasound in 1 year for nodules 0.5 cm or larger  Biopsy for nodules 1 cm or larger         PATHOLOGY:    ASSESSMENT AND PLAN:  This is a 51 y.o. female here for follow-up    1.  Thyroiditis, subclinical hypothyroidism  Thyroiditis resolved. NM uptake scan showed extremely low uptake, she had significant hyperthyroidism at the onset of thyroiditis    Antibodies Graves' disease, Hashimoto's negative    Labs indicate she progressed to subclinical hypothyroidism.  She is clinically asymptomatic.  Reviewed criteria for treatment for subclinical hypothyroidism  We discussed triphasic course of thyroiditis  She is a  believer of no medications unless absolutely necessary   Recommend monitoring with thyroid function tests in a month, sooner if symptomatic      2.  Thyroid nodule  I personally reviewed thyroid ultrasound images,  left thyroid nodule likely a pseudo nodule, it significantly decreased in size and heterogenicity to 2.1 cm TIRADS 3  Recommend thyroid ultrasound in a year for follow-up of thyroid nodules          I have answered all of my patient's questions to the best of my ability. To call us with any changes    Return to office in 4 month to review results or earlier if issues arise     Orders Placed This Encounter   Procedures   • TSH 3rd Generation   • T4, free           This patient has been dictated using speech recognition software. Inadvertent speech recognition errors should be disregarded. Please do not hesitate to call the office for clarification.

## 2023-01-25 ENCOUNTER — OFFICE VISIT (OUTPATIENT)
Dept: FAMILY MEDICINE | Facility: CLINIC | Age: 53
End: 2023-01-25
Payer: COMMERCIAL

## 2023-01-25 VITALS
BODY MASS INDEX: 20.32 KG/M2 | DIASTOLIC BLOOD PRESSURE: 63 MMHG | SYSTOLIC BLOOD PRESSURE: 106 MMHG | TEMPERATURE: 97.8 F | OXYGEN SATURATION: 97 % | WEIGHT: 122.13 LBS | RESPIRATION RATE: 14 BRPM | HEART RATE: 75 BPM

## 2023-01-25 DIAGNOSIS — K14.6 TONGUE SORE: Primary | ICD-10-CM

## 2023-01-25 PROCEDURE — 3008F BODY MASS INDEX DOCD: CPT | Performed by: FAMILY MEDICINE

## 2023-01-25 PROCEDURE — 99213 OFFICE O/P EST LOW 20 MIN: CPT | Performed by: FAMILY MEDICINE

## 2023-01-25 NOTE — PROGRESS NOTES
[facility] Family Medicine at [facility]     Reason for visit:   Chief Complaint   Patient presents with   • tongue lesion     Painful x one month       Emma Bojorquez is a 52 y.o. female    HPI    Pt had a submandibular gland infection Nov 28, 2022  Seen in - given augmentin  Helped the infection and pain    Later, pt felt her tongue was uncomfortable  Was not bothering her much, so ignored the issue  Still feels the discomfort  Had some prominent papillae    Tried an herbal medication- not helping       Review of Systems   Constitutional: Negative for chills, fever and unexpected weight change.   HENT: Positive for mouth sores. Negative for ear pain, postnasal drip, sinus pain and sore throat.    Eyes: Negative for pain.   Respiratory: Negative for cough and shortness of breath.    Cardiovascular: Negative for chest pain and palpitations.   Gastrointestinal: Negative for abdominal pain, diarrhea, nausea and vomiting.   Musculoskeletal: Negative for arthralgias and joint swelling.   Skin: Negative for rash and wound.   Neurological: Negative for dizziness, syncope, weakness and headaches.   Psychiatric/Behavioral: The patient is not nervous/anxious.    All other systems reviewed and are negative.         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   • Thyroid nodule   • Hyperthyroidism   • Thyroiditis   • Subclinical hypothyroidism         Past Medical History:   Diagnosis Date   • Hyperthyroidism      Past Surgical History:   Procedure Laterality Date   • APPENDECTOMY  1996     Social History     Socioeconomic History   • Marital status:      Spouse name: Not on file   • Number of children: Not on file   • Years of education: Not on file   • Highest education level: Not on file   Occupational History   • Not on file   Tobacco Use   • Smoking status: Never   • Smokeless tobacco: Never   Substance and Sexual Activity   • Alcohol use: No   • Drug  "use: No   • Sexual activity: Not on file   Other Topics Concern   • Not on file   Social History Narrative   • Not on file     Social Determinants of Health     Financial Resource Strain: Not on file   Food Insecurity: Not on file   Transportation Needs: Not on file   Physical Activity: Not on file   Stress: Not on file   Social Connections: Not on file   Intimate Partner Violence: Not on file   Housing Stability: Not on file     Family History   Problem Relation Age of Onset   • No Known Problems Biological Mother    • No Known Problems Biological Father        Allergies:  Patient has no known allergies.      Outpatient Encounter Medications as of 1/25/2023:   •  CALCIUM ORAL, Take 1 tablet by mouth daily.  •  cranberry fruit extract (CRANBERRY ORAL), Take by mouth.     Objective   Vitals:    01/25/23 1553   BP: 106/63   Pulse: 75   Resp: 14   Temp: 36.6 °C (97.8 °F)   SpO2: 97%   Weight: 55.4 kg (122 lb 2 oz)     Ht Readings from Last 3 Encounters:   03/18/22 1.651 m (5' 5\")   11/22/21 1.651 m (5' 5\")   11/05/21 1.651 m (5' 5\")     Wt Readings from Last 3 Encounters:   01/25/23 55.4 kg (122 lb 2 oz)   03/18/22 53.3 kg (117 lb 9.6 oz)   11/22/21 51.7 kg (114 lb)     Body mass index is 20.32 kg/m².    Physical Exam  Vitals reviewed.   Constitutional:       General: She is not in acute distress.     Appearance: Normal appearance. She is well-developed. She is not ill-appearing.   HENT:      Head: Normocephalic and atraumatic.      Nose: Nose normal. No congestion.      Mouth/Throat:      Comments: No visible ulceration, sore, irregularity on tongue, cheek, posterior op. No dental abnormality visible.   Eyes:      Conjunctiva/sclera: Conjunctivae normal.      Pupils: Pupils are equal, round, and reactive to light.   Cardiovascular:      Heart sounds: S1 normal and S2 normal.   Musculoskeletal:      Cervical back: Normal range of motion and neck supple.      Right lower leg: No edema.      Left lower leg: No edema. "   Skin:     General: Skin is warm and dry.   Neurological:      Mental Status: She is alert and oriented to person, place, and time.      Gait: Gait normal.      Comments: Speaking clearly and understandably   Psychiatric:         Mood and Affect: Mood normal.         Behavior: Behavior normal.         Thought Content: Thought content normal.         Judgment: Judgment normal.         Point of Care Testing Results  No results found for this visit on 01/25/23.     Assessment   Diagnoses and all orders for this visit:    Tongue sore (Primary)    recommend salt water gargling  Use choloseptic spray prior to eating to avoid mechanical irritation to tongue  If not improving in 2-4 weeks, to ENT    Patient has been educated on the risks, benefits, and side effects of all medication prescribed at this visit, or dose changes.  Patient expressed understanding and agreement with plan.  Return if symptoms worsen or fail to improve.    Nguyen Barrera MD  1/27/2023       There are no Patient Instructions on file for this visit.

## 2023-01-27 ASSESSMENT — ENCOUNTER SYMPTOMS
HEADACHES: 0
FEVER: 0
CHILLS: 0
WEAKNESS: 0
DIARRHEA: 0
ABDOMINAL PAIN: 0
EYE PAIN: 0
NAUSEA: 0
JOINT SWELLING: 0
NERVOUS/ANXIOUS: 0
SHORTNESS OF BREATH: 0
UNEXPECTED WEIGHT CHANGE: 0
SINUS PAIN: 0
ARTHRALGIAS: 0
SORE THROAT: 0
WOUND: 0
DIZZINESS: 0
VOMITING: 0
PALPITATIONS: 0
COUGH: 0

## 2023-03-16 ENCOUNTER — OFFICE VISIT (OUTPATIENT)
Dept: FAMILY MEDICINE | Facility: CLINIC | Age: 53
End: 2023-03-16
Payer: COMMERCIAL

## 2023-03-16 VITALS
TEMPERATURE: 97.8 F | HEART RATE: 82 BPM | SYSTOLIC BLOOD PRESSURE: 104 MMHG | HEIGHT: 64 IN | OXYGEN SATURATION: 99 % | RESPIRATION RATE: 15 BRPM | WEIGHT: 124.25 LBS | BODY MASS INDEX: 21.21 KG/M2 | DIASTOLIC BLOOD PRESSURE: 62 MMHG

## 2023-03-16 DIAGNOSIS — Z12.11 SCREENING FOR COLON CANCER: ICD-10-CM

## 2023-03-16 DIAGNOSIS — E05.90 HYPERTHYROIDISM: ICD-10-CM

## 2023-03-16 DIAGNOSIS — Z00.00 ROUTINE MEDICAL EXAM: Primary | ICD-10-CM

## 2023-03-16 DIAGNOSIS — Z23 NEED FOR VACCINATION: ICD-10-CM

## 2023-03-16 DIAGNOSIS — Z11.59 NEED FOR HEPATITIS C SCREENING TEST: ICD-10-CM

## 2023-03-16 DIAGNOSIS — Z12.31 SCREENING MAMMOGRAM, ENCOUNTER FOR: ICD-10-CM

## 2023-03-16 PROBLEM — Z82.62 FAMILY HISTORY OF OSTEOPOROSIS: Status: ACTIVE | Noted: 2023-03-16

## 2023-03-16 PROCEDURE — 93000 ELECTROCARDIOGRAM COMPLETE: CPT | Performed by: FAMILY MEDICINE

## 2023-03-16 PROCEDURE — 90750 HZV VACC RECOMBINANT IM: CPT | Performed by: FAMILY MEDICINE

## 2023-03-16 PROCEDURE — 3008F BODY MASS INDEX DOCD: CPT | Performed by: FAMILY MEDICINE

## 2023-03-16 PROCEDURE — 99396 PREV VISIT EST AGE 40-64: CPT | Mod: 25 | Performed by: FAMILY MEDICINE

## 2023-03-16 PROCEDURE — 90471 IMMUNIZATION ADMIN: CPT | Performed by: FAMILY MEDICINE

## 2023-03-16 ASSESSMENT — VISUAL ACUITY
OD_CC: 20/25
OS_CC: 20/30

## 2023-03-16 NOTE — PATIENT INSTRUCTIONS
Please take a calcium supplement 600mg daily.  You can buy any brand which works for you from the store.  Also, you can also get additional calcium though natural sources.      You have low vitamin D. I recommend taking vitamin D3 2000 - 4000 units once daily.

## 2023-03-16 NOTE — PROGRESS NOTES
[facility] Family Medicine at [facility]     Reason for visit:   Chief Complaint   Patient presents with   • Annual Exam      Emma Bojorquez is a 52 y.o. female    HPI    Pt's tongue has not changed  Some pain and soreness on the side of her tongue which come and go  No known triggers  Not progressing or worsening    Pt is eating healthy and exercising 5x per week  Last visit to gyn prior to covid      Mammo- due  Colon- due     Review of Systems   Constitutional: Negative for chills, fatigue, fever and unexpected weight change.   HENT: Negative for ear pain, hearing loss, postnasal drip, sinus pressure, sore throat and trouble swallowing.    Eyes: Negative for pain and redness.   Respiratory: Negative for cough, shortness of breath and wheezing.    Cardiovascular: Negative for chest pain and palpitations.   Gastrointestinal: Negative for abdominal pain, constipation, diarrhea, nausea and vomiting.   Skin: Negative for color change and rash.   Neurological: Negative for dizziness, weakness, light-headedness and headaches.   Psychiatric/Behavioral: Negative for suicidal ideas. The patient is not nervous/anxious.    All other systems reviewed and are negative.           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   • Thyroid nodule   • Hyperthyroidism   • Thyroiditis   • Subclinical hypothyroidism   • Family history of osteoporosis         Past Medical History:   Diagnosis Date   • Hyperthyroidism      Past Surgical History:   Procedure Laterality Date   • APPENDECTOMY  1996     Social History     Socioeconomic History   • Marital status:      Spouse name: Not on file   • Number of children: Not on file   • Years of education: Not on file   • Highest education level: Not on file   Occupational History   • Not on file   Tobacco Use   • Smoking status: Never   • Smokeless tobacco: Never   Vaping Use   • Vaping status: Not on file   Substance and Sexual  Activity   • Alcohol use: No   • Drug use: No   • Sexual activity: Not on file   Other Topics Concern   • Not on file   Social History Narrative   • Not on file     Social Determinants of Health     Financial Resource Strain: Not on file   Food Insecurity: Not on file   Transportation Needs: Not on file   Physical Activity: Not on file   Stress: Not on file   Social Connections: Not on file   Intimate Partner Violence: Not on file   Housing Stability: Not on file     Family History   Problem Relation Age of Onset   • No Known Problems Biological Mother    • No Known Problems Biological Father        Allergies:  Patient has no known allergies.      Outpatient Encounter Medications as of 3/16/2023:   •  CALCIUM ORAL, Take 1 tablet by mouth daily.  •  cranberry fruit extract (CRANBERRY ORAL), Take by mouth.         Physical Exam  Vitals and nursing note reviewed.   Constitutional:       General: She is not in acute distress.     Appearance: Normal appearance. She is well-developed. She is not ill-appearing.   HENT:      Head: Normocephalic and atraumatic.      Right Ear: Hearing, tympanic membrane, ear canal and external ear normal.      Left Ear: Hearing, tympanic membrane, ear canal and external ear normal.      Nose: Nose normal. No congestion.      Mouth/Throat:      Mouth: Mucous membranes are moist.      Pharynx: Oropharynx is clear. Uvula midline. No oropharyngeal exudate.      Tonsils: No tonsillar exudate. 0 on the right. 0 on the left.   Eyes:      General: Lids are normal. No scleral icterus.     Extraocular Movements: Extraocular movements intact.      Conjunctiva/sclera: Conjunctivae normal.      Pupils: Pupils are equal, round, and reactive to light.   Neck:      Thyroid: No thyromegaly.      Vascular: No carotid bruit.   Cardiovascular:      Rate and Rhythm: Normal rate and regular rhythm.      Pulses: Normal pulses.           Radial pulses are 2+ on the right side and 2+ on the left side.         "Dorsalis pedis pulses are 2+ on the right side and 2+ on the left side.      Heart sounds: Normal heart sounds, S1 normal and S2 normal. No murmur heard.     No friction rub. No gallop.   Pulmonary:      Effort: Pulmonary effort is normal.      Breath sounds: Normal breath sounds. No wheezing, rhonchi or rales.   Chest:      Chest wall: No deformity.   Abdominal:      General: Bowel sounds are normal. There is no distension.      Palpations: Abdomen is soft.      Tenderness: There is no abdominal tenderness. There is no guarding or rebound.   Genitourinary:     Comments: deferred  Musculoskeletal:         General: No tenderness or deformity. Normal range of motion.      Cervical back: Normal range of motion and neck supple.      Right lower leg: No edema.      Left lower leg: No edema.   Lymphadenopathy:      Head:      Right side of head: No submandibular adenopathy.      Left side of head: No submandibular adenopathy.      Cervical: No cervical adenopathy.   Skin:     General: Skin is warm and dry.      Capillary Refill: Capillary refill takes less than 2 seconds.      Findings: No rash.   Neurological:      Mental Status: She is alert and oriented to person, place, and time.      Cranial Nerves: No cranial nerve deficit.      Deep Tendon Reflexes: Reflexes normal.      Reflex Scores:       Bicep reflexes are 2+ on the right side and 2+ on the left side.       Patellar reflexes are 2+ on the right side and 2+ on the left side.     Comments: Speaking clearly and understandably   Psychiatric:         Mood and Affect: Mood normal.         Speech: Speech normal.         Behavior: Behavior normal.         Thought Content: Thought content normal.         Judgment: Judgment normal.           Vitals:    03/16/23 1439   BP: 104/62   Pulse: 82   Resp: 15   Temp: 36.6 °C (97.8 °F)   SpO2: 99%   Weight: 56.4 kg (124 lb 4 oz)   Height: 1.632 m (5' 4.25\")       Ht Readings from Last 3 Encounters:   03/16/23 1.632 m (5' 4.25\") " "  03/18/22 1.651 m (5' 5\")   11/22/21 1.651 m (5' 5\")     Wt Readings from Last 10 Encounters:   03/16/23 56.4 kg (124 lb 4 oz)   01/25/23 55.4 kg (122 lb 2 oz)   03/18/22 53.3 kg (117 lb 9.6 oz)   11/22/21 51.7 kg (114 lb)   11/05/21 50.2 kg (110 lb 9.6 oz)   10/27/21 52.2 kg (115 lb)   10/04/19 58.7 kg (129 lb 8 oz)   04/12/19 59 kg (130 lb 2 oz)   08/09/18 59.7 kg (131 lb 9.6 oz)       Body mass index is 21.16 kg/m².       Lab Results   Component Value Date    WBC 5.7 11/15/2021    HGB 12.4 11/15/2021    HCT 36.8 11/15/2021     11/15/2021     11/15/2021    K 4.2 11/15/2021     11/15/2021    BUN 9 11/15/2021    CREATININE 0.51 (L) 11/15/2021    CO2 25 11/15/2021    ALT 24 11/15/2021    AST 21 11/15/2021    CHOL 171 11/15/2021    TRIG 125 11/15/2021    HDL 57 11/15/2021    LDLCALC 92 11/15/2021    TSH 7.220 (H) 03/05/2022       No results found for: HGBA1C    No results found for this visit on 03/16/23.       Assessment   Diagnoses and all orders for this visit:    Routine medical exam (Primary)  Recommend healthy diet/ exercise  Encourage weight management to appropriate goal as discussed with patient.  Ekg- sinus rhythm, normal ekg  Check labs as listed  Rec routine gyn, dental, eye exams  If sore on tongue continues- see ENT    -     ECG 12 LEAD OFFICE PERFORMED  -     Comprehensive metabolic panel; Future  -     CBC and Differential; Future  -     Lipid panel; Future    Hyperthyroidism  Assessment & Plan:  Check labs    Orders:  -     TSH 3rd Generation; Future  -     T4, free; Future  -     T3, free; Future    Screening mammogram, encounter for  -     BI SCREENING MAMMOGRAM BILATERAL(TOMOSYNTHESIS); Future    Screening for colon cancer  Comments:  refer to [facility] for routine colonoscopy    Need for hepatitis C screening test  -     Hepatitis C antibody; Future    Need for vaccination  -     Shingrix    Patient has been educated on the risks, benefits, and side effects of all medication " prescribed at this visit or dose changes.  Patient expressed understanding and agreement with plan.  Return in about 1 year (around 3/16/2024) for Physical.  Nguyen Barrera MD  3/19/2023       Patient Instructions   Please take a calcium supplement 600mg daily.  You can buy any brand which works for you from the store.  Also, you can also get additional calcium though natural sources.      You have low vitamin D. I recommend taking vitamin D3 2000 - 4000 units once daily.

## 2023-03-19 ASSESSMENT — ENCOUNTER SYMPTOMS
EYE REDNESS: 0
WHEEZING: 0
TROUBLE SWALLOWING: 0
LIGHT-HEADEDNESS: 0
NERVOUS/ANXIOUS: 0
FEVER: 0
COLOR CHANGE: 0
DIARRHEA: 0
SORE THROAT: 0
HEADACHES: 0
CONSTIPATION: 0
FATIGUE: 0
WEAKNESS: 0
SHORTNESS OF BREATH: 0
DIZZINESS: 0
UNEXPECTED WEIGHT CHANGE: 0
PALPITATIONS: 0
EYE PAIN: 0
ABDOMINAL PAIN: 0
CHILLS: 0
COUGH: 0
NAUSEA: 0
VOMITING: 0
SINUS PRESSURE: 0

## 2023-05-12 ENCOUNTER — TELEPHONE (OUTPATIENT)
Dept: FAMILY MEDICINE | Facility: CLINIC | Age: 53
End: 2023-05-12
Payer: COMMERCIAL

## 2023-05-26 ENCOUNTER — TELEPHONE (OUTPATIENT)
Dept: FAMILY MEDICINE | Facility: CLINIC | Age: 53
End: 2023-05-26

## 2023-05-26 ENCOUNTER — OFFICE VISIT (OUTPATIENT)
Dept: FAMILY MEDICINE | Facility: CLINIC | Age: 53
End: 2023-05-26
Payer: COMMERCIAL

## 2023-05-26 VITALS
WEIGHT: 123 LBS | SYSTOLIC BLOOD PRESSURE: 102 MMHG | RESPIRATION RATE: 15 BRPM | DIASTOLIC BLOOD PRESSURE: 62 MMHG | HEART RATE: 88 BPM | BODY MASS INDEX: 20.95 KG/M2 | OXYGEN SATURATION: 98 %

## 2023-05-26 DIAGNOSIS — M54.9 UPPER BACK PAIN: ICD-10-CM

## 2023-05-26 DIAGNOSIS — Z23 NEED FOR VACCINATION: Primary | ICD-10-CM

## 2023-05-26 DIAGNOSIS — R07.81 RIB PAIN: Primary | ICD-10-CM

## 2023-05-26 PROCEDURE — 99213 OFFICE O/P EST LOW 20 MIN: CPT | Performed by: FAMILY MEDICINE

## 2023-05-26 PROCEDURE — 3008F BODY MASS INDEX DOCD: CPT | Performed by: FAMILY MEDICINE

## 2023-05-26 ASSESSMENT — ENCOUNTER SYMPTOMS
EYE PAIN: 0
VOMITING: 0
PALPITATIONS: 0
WOUND: 0
SHORTNESS OF BREATH: 0
UNEXPECTED WEIGHT CHANGE: 0
DIZZINESS: 0
CHILLS: 0
HEADACHES: 0
BACK PAIN: 1
FEVER: 0
COUGH: 0
NERVOUS/ANXIOUS: 0
NAUSEA: 0
WEAKNESS: 0
SINUS PAIN: 0
JOINT SWELLING: 0
ARTHRALGIAS: 0
DIARRHEA: 0
SORE THROAT: 0
ABDOMINAL PAIN: 0

## 2023-05-26 ASSESSMENT — PATIENT HEALTH QUESTIONNAIRE - PHQ9: SUM OF ALL RESPONSES TO PHQ9 QUESTIONS 1 & 2: 0

## 2023-05-26 NOTE — PROGRESS NOTES
[facility] Family Medicine at [facility]     Reason for visit:   Chief Complaint   Patient presents with   • Pain     Rib cage and upper back x 3 weeks      Emma Bojorquez is a 53 y.o. female    HPI     Patient feeling pain in her rib cage R >L  Began 3 weeks ago  Pt doing more computer work recently  Pt is sitting longer    Pt is exercising, doing jogging and stretching her back  No recent exercise changes  Tried salon pas and capsicum patch- minimal relief       Review of Systems   Constitutional: Negative for chills, fever and unexpected weight change.   HENT: Negative for ear pain, postnasal drip, sinus pain and sore throat.    Eyes: Negative for pain.   Respiratory: Negative for cough and shortness of breath.    Cardiovascular: Negative for chest pain and palpitations.   Gastrointestinal: Negative for abdominal pain, diarrhea, nausea and vomiting.   Musculoskeletal: Positive for back pain. Negative for arthralgias and joint swelling.   Skin: Negative for rash and wound.   Neurological: Negative for dizziness, syncope, weakness and headaches.   Psychiatric/Behavioral: The patient is not nervous/anxious.    All other systems reviewed and are negative.         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   • Thyroid nodule   • Hyperthyroidism   • Thyroiditis   • Subclinical hypothyroidism   • Family history of osteoporosis         Past Medical History:   Diagnosis Date   • Hyperthyroidism      Past Surgical History:   Procedure Laterality Date   • APPENDECTOMY  1996     Social History     Socioeconomic History   • Marital status:      Spouse name: Not on file   • Number of children: Not on file   • Years of education: Not on file   • Highest education level: Not on file   Occupational History   • Not on file   Tobacco Use   • Smoking status: Never   • Smokeless tobacco: Never   Vaping Use   • Vaping status: Not on file   Substance and Sexual  Activity   • Alcohol use: No   • Drug use: No   • Sexual activity: Not on file   Other Topics Concern   • Not on file   Social History Narrative   • Not on file     Social Determinants of Health     Financial Resource Strain: Not on file   Food Insecurity: Not on file   Transportation Needs: Not on file   Physical Activity: Not on file   Stress: Not on file   Social Connections: Not on file   Intimate Partner Violence: Not on file   Housing Stability: Not on file     Family History   Problem Relation Age of Onset   • No Known Problems Biological Mother    • No Known Problems Biological Father        Allergies:  Patient has no known allergies.      Outpatient Encounter Medications as of 5/26/2023:   •  CALCIUM ORAL, Take 1 tablet by mouth daily.  •  cranberry fruit extract (CRANBERRY ORAL), Take by mouth.     Objective     Physical Exam  Vitals reviewed.   Constitutional:       General: She is not in acute distress.     Appearance: Normal appearance. She is well-developed. She is not ill-appearing.   HENT:      Head: Normocephalic and atraumatic.      Nose: Nose normal. No congestion.   Eyes:      Conjunctiva/sclera: Conjunctivae normal.      Pupils: Pupils are equal, round, and reactive to light.   Cardiovascular:      Heart sounds: S1 normal and S2 normal.   Musculoskeletal:      Cervical back: Normal range of motion and neck supple.      Right lower leg: No edema.      Left lower leg: No edema.      Comments: Tender to palpation diffusely over anterior, side, and posterior ribs B in soft tissue, no bony tenderness.     Skin:     General: Skin is warm and dry.   Neurological:      Mental Status: She is alert and oriented to person, place, and time.      Gait: Gait normal.      Comments: Speaking clearly and understandably   Psychiatric:         Mood and Affect: Mood normal.         Behavior: Behavior normal.         Thought Content: Thought content normal.         Judgment: Judgment normal.         Vitals:     "05/26/23 1110   BP: 102/62   Pulse: 88   Resp: 15   SpO2: 98%   Weight: 55.8 kg (123 lb)     Ht Readings from Last 3 Encounters:   03/16/23 1.632 m (5' 4.25\")   03/18/22 1.651 m (5' 5\")   11/22/21 1.651 m (5' 5\")     Wt Readings from Last 5 Encounters:   05/26/23 55.8 kg (123 lb)   03/16/23 56.4 kg (124 lb 4 oz)   01/25/23 55.4 kg (122 lb 2 oz)   03/18/22 53.3 kg (117 lb 9.6 oz)   11/22/21 51.7 kg (114 lb)     Body mass index is 20.95 kg/m².    Point of Care Testing Results  No results found for this visit on 05/26/23.       Assessment   Diagnoses and all orders for this visit:    Rib pain (Primary)  Suspect muscular pain  Warm compresses   Stretching of the area  Exercises demonstrated and given  Discussed good posture      Upper back pain  As above    Patient has been educated on the risks, benefits, and side effects of all medication prescribed at this visit, or dose changes.  Patient expressed understanding and agreement with plan.  Return if symptoms worsen or fail to improve.    Nguyen Barrera MD  5/26/2023       There are no Patient Instructions on file for this visit.                      "

## 2023-05-26 NOTE — TELEPHONE ENCOUNTER
Patient may receive ordered vaccine on scheduled date as long as patient is fever free and in her usual state of health.

## 2023-06-16 ENCOUNTER — CLINICAL SUPPORT (OUTPATIENT)
Dept: FAMILY MEDICINE | Facility: CLINIC | Age: 53
End: 2023-06-16
Payer: COMMERCIAL

## 2023-06-16 PROCEDURE — 90750 HZV VACC RECOMBINANT IM: CPT | Performed by: FAMILY MEDICINE

## 2023-06-16 PROCEDURE — 99999 PR OFFICE/OUTPT VISIT,PROCEDURE ONLY: CPT | Performed by: FAMILY MEDICINE

## 2023-06-16 PROCEDURE — 90471 IMMUNIZATION ADMIN: CPT | Performed by: FAMILY MEDICINE

## 2023-09-30 ENCOUNTER — NURSE TRIAGE (OUTPATIENT)
Dept: FAMILY MEDICINE | Facility: CLINIC | Age: 53
End: 2023-09-30
Payer: COMMERCIAL

## 2023-09-30 RX ORDER — NITROFURANTOIN 25; 75 MG/1; MG/1
100 CAPSULE ORAL 2 TIMES DAILY
Qty: 10 CAPSULE | Refills: 0 | Status: SHIPPED | OUTPATIENT
Start: 2023-09-30 | End: 2023-10-05

## 2023-09-30 NOTE — TELEPHONE ENCOUNTER
"Synopsis:   Symptoms of frequency, urgency and dysuria that began 1 week ago. History of UTI's in the past. The last one was about 1 year ago. She takes cranberry pills on and off. Prescribed Macrobid and discussed increased water intake. Discussed if symptoms continue she will need to be seen in office and would need a urine sample for testing. She verbalized understanding.     Disposition:  See PCP Within 2 Weeks  Care Advice:  Care Advice Given     Given By Given At [facility]    Pao Osei CRNP 9/30/2023 11:04 AM No    CALL BACK IF:    * Fever occurs    * Unable to urinate and bladder feels full    * You become worse.        Orders Placed This Encounter:  Orders Placed This Encounter    nitrofurantoin, macrocrystal-monohydrate, (MACROBID) 100 mg capsule     Sig: Take 1 capsule (100 mg total) by mouth 2 (two) times a day for 5 days.     Dispense:  10 capsule     Refill:  0             Reason for Disposition   All other urine symptoms    Answer Assessment - Initial Assessment Questions  1. SYMPTOM: \"What's the main symptom you're concerned about?\" (e.g., frequency, incontinence)    Symptoms of frequency, urgency and dysuria that began 1 week ago. History of UTI's in the past. The last one was about 1 year ago. She takes cranberry pills on and off. Prescribed Macrobid and discussed increased water intake. Discussed if symptoms continue she will need to be seen in office and would need a urine sample for testing. She verbalized understanding.    Protocols used: URINARY SYMPTOMS-ADULT-      "

## 2023-10-11 ENCOUNTER — OFFICE VISIT (OUTPATIENT)
Dept: FAMILY MEDICINE | Facility: CLINIC | Age: 53
End: 2023-10-11
Payer: COMMERCIAL

## 2023-10-11 VITALS
HEART RATE: 68 BPM | DIASTOLIC BLOOD PRESSURE: 70 MMHG | HEIGHT: 64 IN | OXYGEN SATURATION: 99 % | WEIGHT: 125 LBS | SYSTOLIC BLOOD PRESSURE: 108 MMHG | RESPIRATION RATE: 18 BRPM | TEMPERATURE: 97.6 F | BODY MASS INDEX: 21.34 KG/M2

## 2023-10-11 DIAGNOSIS — M25.512 CHRONIC PAIN OF BOTH SHOULDERS: ICD-10-CM

## 2023-10-11 DIAGNOSIS — M25.511 CHRONIC PAIN OF BOTH SHOULDERS: ICD-10-CM

## 2023-10-11 DIAGNOSIS — R30.0 DYSURIA: Primary | ICD-10-CM

## 2023-10-11 DIAGNOSIS — G89.29 CHRONIC PAIN OF BOTH SHOULDERS: ICD-10-CM

## 2023-10-11 LAB
BILIRUBIN, POC: NEGATIVE
BLOOD URINE, POC: NEGATIVE
CLARITY, POC: CLEAR
COLOR, POC: YELLOW
EXPIRATION DATE: NORMAL
GLUCOSE URINE, POC: NEGATIVE
KETONES, POC: NEGATIVE
LEUKOCYTE EST, POC: NEGATIVE
Lab: NORMAL
NITRITE, POC: NEGATIVE
PH, POC: 6
POCT MANUFACTURER: NORMAL
PROTEIN, POC: NEGATIVE
SPECIFIC GRAVITY, POC: 1.01
UROBILINOGEN, POC: 0.2

## 2023-10-11 PROCEDURE — 81002 URINALYSIS NONAUTO W/O SCOPE: CPT | Performed by: FAMILY MEDICINE

## 2023-10-11 PROCEDURE — 3008F BODY MASS INDEX DOCD: CPT | Performed by: FAMILY MEDICINE

## 2023-10-11 PROCEDURE — 99214 OFFICE O/P EST MOD 30 MIN: CPT | Performed by: FAMILY MEDICINE

## 2023-10-11 NOTE — PROGRESS NOTES
[facility] at [facility]     Reason for visit:   Chief Complaint   Patient presents with    UTI     Discomfort while urinating. Had previous UTI and was treated but feels like the infection is still lingering.     Shoulder Pain     Bilateral       Emma Bojorquez is a 53 y.o. female    HPI    Pt c/o mild uti symptoms from Sept 29th onwards  Persistent symptoms for 2 weeks  Called the np triage- took macrobid x 5 days  Drinking a lot of water    Felt it was improving somewhat.    Feels she has some pain when she urinates  Takes cranberry extract daily, sometimes she forgets    Pain behind her shoulder blade for 2 weeks L>R  Pt was hanging from monkey bars        Review of Systems   Constitutional: Negative for chills, fever and unexpected weight change.   HENT: Negative for ear pain, postnasal drip, sinus pain and sore throat.    Eyes: Negative for pain.   Respiratory: Negative for cough and shortness of breath.    Cardiovascular: Negative for chest pain and palpitations.   Gastrointestinal: Negative for abdominal pain, diarrhea, nausea and vomiting.   Genitourinary: Positive for frequency and urgency. Negative for dysuria.   Musculoskeletal: Negative for arthralgias and joint swelling.   Skin: Negative for rash and wound.   Neurological: Negative for dizziness, syncope, weakness and headaches.   Psychiatric/Behavioral: The patient is not nervous/anxious.    All other systems reviewed and are negative.         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    Thyroid nodule    Hyperthyroidism    Thyroiditis    Subclinical hypothyroidism    Family history of osteoporosis         Past Medical History:   Diagnosis Date    Hyperthyroidism      Past Surgical History:   Procedure Laterality Date    APPENDECTOMY  1996     Social History     Socioeconomic History    Marital status:      Spouse name: Not on file    Number of children:  Not on file    Years of education: Not on file    Highest education level: Not on file   Occupational History    Not on file   Tobacco Use    Smoking status: Never    Smokeless tobacco: Never   Vaping Use    Vaping Use: Never used   Substance and Sexual Activity    Alcohol use: No    Drug use: No    Sexual activity: Not on file   Other Topics Concern    Not on file   Social History Narrative    Not on file     Social Determinants of Health     Financial Resource Strain: Not on file   Food Insecurity: Not on file   Transportation Needs: Not on file   Physical Activity: Not on file   Stress: Not on file   Social Connections: Not on file   Intimate Partner Violence: Not on file   Housing Stability: Not on file     Family History   Problem Relation Age of Onset    No Known Problems Biological Mother     No Known Problems Biological Father        Allergies:  Patient has no known allergies.      Outpatient Encounter Medications as of 10/11/2023:     CALCIUM ORAL, Take 1 tablet by mouth daily.    cranberry fruit extract (CRANBERRY ORAL), Take by mouth.     Objective     Physical Exam  Vitals reviewed.   Constitutional:       General: She is not in acute distress.     Appearance: Normal appearance. She is well-developed. She is not ill-appearing.   HENT:      Head: Normocephalic and atraumatic.      Nose: Nose normal. No congestion.   Eyes:      Conjunctiva/sclera: Conjunctivae normal.      Pupils: Pupils are equal, round, and reactive to light.   Cardiovascular:      Heart sounds: S1 normal and S2 normal.   Genitourinary:     Comments: No cva tenderness  Musculoskeletal:      Cervical back: Normal range of motion and neck supple.      Right lower leg: No edema.      Left lower leg: No edema.   Skin:     General: Skin is warm and dry.   Neurological:      Mental Status: She is alert and oriented to person, place, and time.      Gait: Gait normal.      Comments: Speaking clearly and understandably  "  Psychiatric:         Mood and Affect: Mood normal.         Behavior: Behavior normal.         Thought Content: Thought content normal.         Judgment: Judgment normal.         Vitals:    10/11/23 1415   BP: 108/70   BP Location: Left upper arm   Patient Position: Sitting   Pulse: 68   Resp: 18   Temp: 36.4 °C (97.6 °F)   TempSrc: Temporal   SpO2: 99%   Weight: 56.7 kg (125 lb)   Height: 1.632 m (5' 4.25\")     Ht Readings from Last 3 Encounters:   10/11/23 1.632 m (5' 4.25\")   03/16/23 1.632 m (5' 4.25\")   03/18/22 1.651 m (5' 5\")     Wt Readings from Last 5 Encounters:   10/11/23 56.7 kg (125 lb)   05/26/23 55.8 kg (123 lb)   03/16/23 56.4 kg (124 lb 4 oz)   01/25/23 55.4 kg (122 lb 2 oz)   03/18/22 53.3 kg (117 lb 9.6 oz)     Body mass index is 21.29 kg/m².    Point of Care Testing Results  Results for orders placed or performed in visit on 10/11/23   POCT urinalysis dipstick   Result Value Ref Range    Color, UA Yellow     Clarity, UA Clear     Glucose, UA Negative     Bilirubin, UA Negative     Ketones, UA Negative     Spec Grav, UA 1.015     Blood, UA Negative     pH, UA 6.0     Protein, UA Negative     Urobilinogen, UA 0.2     Leukocytes, UA Negative     Nitrite, UA Negative     Expiration Date 9/13/2024     Lot Number [id number]     POCT  [facility]           Assessment   Diagnoses and all orders for this visit:    Dysuria (Primary)   Check ua-> negative  Start either cranberry juice or tablets daily  May take d- mannose if sensation of uti's returns    Patient made aware of the impact of dehydration on UTI's.    Discussed barriers to drinking water throughout the day.  I recommend keeping a water bottle nearby to help as a reminder and measurement tool for adequate water intake.      -     POCT urinalysis dipstick    Chronic pain of both shoulders  Warm compresses to upper back  Stretching exercises given    Patient has been educated on the risks, benefits, and side effects of all medication " prescribed at this visit, or dose changes.  Patient expressed understanding and agreement with plan.  Return if symptoms worsen or fail to improve.    Nguyen Barrera MD  10/15/2023       There are no Patient Instructions on file for this visit.

## 2023-10-15 ASSESSMENT — ENCOUNTER SYMPTOMS
ABDOMINAL PAIN: 0
FREQUENCY: 1
UNEXPECTED WEIGHT CHANGE: 0
NAUSEA: 0
WEAKNESS: 0
SORE THROAT: 0
NERVOUS/ANXIOUS: 0
PALPITATIONS: 0
SHORTNESS OF BREATH: 0
COUGH: 0
DIZZINESS: 0
SINUS PAIN: 0
FEVER: 0
HEADACHES: 0
ARTHRALGIAS: 0
VOMITING: 0
EYE PAIN: 0
DYSURIA: 0
DIARRHEA: 0
JOINT SWELLING: 0
WOUND: 0
CHILLS: 0

## 2023-12-15 LAB
BASOPHILS # BLD AUTO: 0 X10E3/UL (ref 0–0.2)
BASOPHILS NFR BLD AUTO: 0 %
EOSINOPHIL # BLD AUTO: 0.1 X10E3/UL (ref 0–0.4)
EOSINOPHIL NFR BLD AUTO: 1 %
ERYTHROCYTE [DISTWIDTH] IN BLOOD BY AUTOMATED COUNT: 12.1 % (ref 11.7–15.4)
HCT VFR BLD AUTO: 39.7 % (ref 34–46.6)
HGB BLD-MCNC: 13.2 G/DL (ref 11.1–15.9)
IMM GRANULOCYTES # BLD AUTO: 0 X10E3/UL (ref 0–0.1)
IMM GRANULOCYTES NFR BLD AUTO: 0 %
LYMPHOCYTES # BLD AUTO: 1.7 X10E3/UL (ref 0.7–3.1)
LYMPHOCYTES NFR BLD AUTO: 24 %
MCH RBC QN AUTO: 32.4 PG (ref 26.6–33)
MCHC RBC AUTO-ENTMCNC: 33.2 G/DL (ref 31.5–35.7)
MCV RBC AUTO: 98 FL (ref 79–97)
MONOCYTES # BLD AUTO: 0.5 X10E3/UL (ref 0.1–0.9)
MONOCYTES NFR BLD AUTO: 7 %
NEUTROPHILS # BLD AUTO: 4.6 X10E3/UL (ref 1.4–7)
NEUTROPHILS NFR BLD AUTO: 68 %
PLATELET # BLD AUTO: 283 X10E3/UL (ref 150–450)
RBC # BLD AUTO: 4.07 X10E6/UL (ref 3.77–5.28)
WBC # BLD AUTO: 6.8 X10E3/UL (ref 3.4–10.8)

## 2023-12-16 LAB
ALBUMIN SERPL-MCNC: 4.4 G/DL (ref 3.8–4.9)
ALBUMIN/GLOB SERPL: 1.4 {RATIO} (ref 1.2–2.2)
ALP SERPL-CCNC: 53 IU/L (ref 44–121)
ALT SERPL-CCNC: 14 IU/L (ref 0–32)
AST SERPL-CCNC: 18 IU/L (ref 0–40)
BILIRUB SERPL-MCNC: 0.6 MG/DL (ref 0–1.2)
BUN SERPL-MCNC: 11 MG/DL (ref 6–24)
BUN/CREAT SERPL: 17 (ref 9–23)
CALCIUM SERPL-MCNC: 8.4 MG/DL (ref 8.7–10.2)
CHLORIDE SERPL-SCNC: 97 MMOL/L (ref 96–106)
CHOLEST SERPL-MCNC: 198 MG/DL (ref 100–199)
CO2 SERPL-SCNC: 25 MMOL/L (ref 20–29)
CREAT SERPL-MCNC: 0.66 MG/DL (ref 0.57–1)
EGFRCR SERPLBLD CKD-EPI 2021: 106 ML/MIN/1.73
GLOBULIN SER CALC-MCNC: 3.1 G/DL (ref 1.5–4.5)
GLUCOSE SERPL-MCNC: 103 MG/DL (ref 70–99)
HCV IGG SERPL QL IA: NON REACTIVE
HDLC SERPL-MCNC: 68 MG/DL
LDLC SERPL CALC-MCNC: 113 MG/DL (ref 0–99)
POTASSIUM SERPL-SCNC: ABNORMAL MMOL/L
PROT SERPL-MCNC: 7.5 G/DL (ref 6–8.5)
SODIUM SERPL-SCNC: 128 MMOL/L (ref 134–144)
T3FREE SERPL-MCNC: 2.4 PG/ML (ref 2–4.4)
T4 FREE SERPL-MCNC: 1.22 NG/DL (ref 0.82–1.77)
TRIGL SERPL-MCNC: 93 MG/DL (ref 0–149)
TSH SERPL DL<=0.005 MIU/L-ACNC: 2.62 UIU/ML (ref 0.45–4.5)
VLDLC SERPL CALC-MCNC: 17 MG/DL (ref 5–40)

## 2023-12-18 ENCOUNTER — TELEPHONE (OUTPATIENT)
Dept: FAMILY MEDICINE | Facility: CLINIC | Age: 53
End: 2023-12-18
Payer: COMMERCIAL

## 2023-12-18 DIAGNOSIS — E87.1 HYPONATREMIA: Primary | ICD-10-CM

## 2023-12-18 NOTE — TELEPHONE ENCOUNTER
Spoke with pt. She was warned about risks of hyponatremia. Pt agreed to repeating labs today. Lab slip left at  for .

## 2023-12-18 NOTE — TELEPHONE ENCOUNTER
----- Message from Nguyen Barrera MD sent at 12/18/2023  9:56 AM EST -----  Labs show lipids have increased, now borderline high.  Her sodium level is very very low which can cause major problems.  I want her to repeat her bmp today to see if this is still low.

## 2023-12-20 ENCOUNTER — TELEPHONE (OUTPATIENT)
Dept: FAMILY MEDICINE | Facility: CLINIC | Age: 53
End: 2023-12-20
Payer: COMMERCIAL

## 2023-12-20 LAB
BUN SERPL-MCNC: 9 MG/DL (ref 6–24)
BUN/CREAT SERPL: 15 (ref 9–23)
CALCIUM SERPL-MCNC: 9 MG/DL (ref 8.7–10.2)
CHLORIDE SERPL-SCNC: 104 MMOL/L (ref 96–106)
CO2 SERPL-SCNC: 21 MMOL/L (ref 20–29)
CREAT SERPL-MCNC: 0.6 MG/DL (ref 0.57–1)
EGFRCR SERPLBLD CKD-EPI 2021: 109 ML/MIN/1.73
GLUCOSE SERPL-MCNC: 83 MG/DL (ref 70–99)
POTASSIUM SERPL-SCNC: 4.3 MMOL/L (ref 3.5–5.2)
SODIUM SERPL-SCNC: 138 MMOL/L (ref 134–144)

## 2023-12-20 NOTE — TELEPHONE ENCOUNTER
----- Message from Nguyen Barrera MD sent at 12/20/2023  8:35 AM EST -----  Repeat sodium levels are wnl.  Nothing further to do.

## 2024-07-24 ENCOUNTER — PATIENT OUTREACH (OUTPATIENT)
Dept: FAMILY MEDICINE | Facility: CLINIC | Age: 54
End: 2024-07-24
Payer: COMMERCIAL

## 2024-07-24 NOTE — PROGRESS NOTES
Care Gap Team has outreached to Emma Bojorquez on behalf of their primary care provider.      [facility]    Care Gap(s) Identified: Cervical Cancer Screening, Colorectal Cancer Screening, Breast Cancer Screening    Care Gap Status: Due    Outreach via: Telephone    Adult Preventive Wellness Protocol(s) Used: Colorectal Cancer Screening, Cervical Cancer Screening, Breast Cancer Screening    Chart Review Completed: Yes  Patient interview completed: No  Inclusion Criteria: Met  Exclusion Criteria: None  Patient educated on recommended care: No    Order or Clinical Referral: None          Appointment provided: No              Called and left VM for patient letting them know that they are due for their cervical cancer screening and breast cancer screening. Also would like to ask pt if she has had a colonoscopy and if so, where.  Asked patient to return my call.

## 2024-10-02 ENCOUNTER — TELEPHONE (OUTPATIENT)
Dept: FAMILY MEDICINE | Facility: CLINIC | Age: 54
End: 2024-10-02

## 2024-10-02 ENCOUNTER — OFFICE VISIT (OUTPATIENT)
Dept: FAMILY MEDICINE | Facility: CLINIC | Age: 54
End: 2024-10-02
Payer: COMMERCIAL

## 2024-10-02 ENCOUNTER — HOSPITAL ENCOUNTER (OUTPATIENT)
Dept: RADIOLOGY | Age: 54
Discharge: HOME | End: 2024-10-02
Attending: FAMILY MEDICINE
Payer: COMMERCIAL

## 2024-10-02 VITALS
RESPIRATION RATE: 13 BRPM | OXYGEN SATURATION: 98 % | HEART RATE: 74 BPM | SYSTOLIC BLOOD PRESSURE: 98 MMHG | DIASTOLIC BLOOD PRESSURE: 62 MMHG | HEIGHT: 64 IN | WEIGHT: 121.5 LBS | TEMPERATURE: 98.1 F | BODY MASS INDEX: 20.74 KG/M2

## 2024-10-02 DIAGNOSIS — Z23 NEED FOR VACCINATION: Primary | ICD-10-CM

## 2024-10-02 DIAGNOSIS — E55.9 VITAMIN D DEFICIENCY: ICD-10-CM

## 2024-10-02 DIAGNOSIS — Z12.31 SCREENING MAMMOGRAM FOR BREAST CANCER: ICD-10-CM

## 2024-10-02 DIAGNOSIS — M54.2 CERVICALGIA: ICD-10-CM

## 2024-10-02 DIAGNOSIS — E03.8 SUBCLINICAL HYPOTHYROIDISM: ICD-10-CM

## 2024-10-02 DIAGNOSIS — M54.9 UPPER BACK PAIN: ICD-10-CM

## 2024-10-02 DIAGNOSIS — Z00.00 ROUTINE MEDICAL EXAM: Primary | ICD-10-CM

## 2024-10-02 LAB
BILIRUBIN, POC: NEGATIVE
BLOOD URINE, POC: NEGATIVE
CLARITY, POC: CLEAR
COLOR, POC: YELLOW
EXPIRATION DATE: 0
GLUCOSE URINE, POC: NEGATIVE
KETONES, POC: NEGATIVE
LEUKOCYTE EST, POC: NEGATIVE
Lab: 0
NITRITE, POC: NEGATIVE
PH, POC: 6
POCT MANUFACTURER: 0
PROTEIN, POC: NEGATIVE
SPECIFIC GRAVITY, POC: 1
UROBILINOGEN, POC: 0.2

## 2024-10-02 PROCEDURE — 72050 X-RAY EXAM NECK SPINE 4/5VWS: CPT

## 2024-10-02 PROCEDURE — 99396 PREV VISIT EST AGE 40-64: CPT | Performed by: FAMILY MEDICINE

## 2024-10-02 PROCEDURE — 3008F BODY MASS INDEX DOCD: CPT | Performed by: FAMILY MEDICINE

## 2024-10-02 PROCEDURE — 81002 URINALYSIS NONAUTO W/O SCOPE: CPT | Performed by: FAMILY MEDICINE

## 2024-10-02 ASSESSMENT — VISUAL ACUITY
OS_CC: 20/20
OD_CC: 20/20

## 2024-10-02 ASSESSMENT — PATIENT HEALTH QUESTIONNAIRE - PHQ9: SUM OF ALL RESPONSES TO PHQ9 QUESTIONS 1 & 2: 0

## 2024-10-02 NOTE — LETTER
Preventive Exam Fax Back Request   Date:10/04/24     To:  [facility]    FAX#:  [phone]    From: Nguyen Barrera MD/Clinical Team  [facility] in [place] [address]   Phone: [phone]  Fax: [phone]    RE:  Emma Bojorquez (: 1970)     We are reaching out to you because our mutual patient, Emma Bojorquez (: 1970), reported they had the preventive procedure(s) indicated below performed at your facility:       Colorectal Cancer Screening (Colonoscopy, Pathology (if indicated) and Follow up Recommendations      Please fax the most recent results to our office with this Cover Sheet.  (If no results are found, please check the appropriate box below)  ? - No results found  ? - Results attached    Please fax to: FAX# [phone]

## 2024-10-02 NOTE — PATIENT INSTRUCTIONS
Recommended Physical Therapy Locations    [place] / [place]:  [facility]  [address] [place] [address] [place] [address]  [phone]    [place]:   [facility]   [address] [place] [address] [place]  [phone]    [place]:   [facility] in [place] @ [address]  [phone]

## 2024-10-02 NOTE — PROGRESS NOTES
[facility] at [facility]     Reason for visit:   Chief Complaint   Patient presents with    Annual Exam     Upper back and shoulder pain continuing on      Liyuan Ma is a 54 y.o. female    HPI    Pt has a history of frozen shoulder  Having shoulder and back pain again  Sometimes improves w hen she's not sitting at a computer  But pain always comes back    Working from home 3x per week  Using lidocaine patches  Doing the exercises for stretching  Using heating pad      Pt had colonoscopy done at [facility]       Review of Systems   Constitutional:  Negative for chills, fatigue, fever and unexpected weight change.   HENT:  Negative for ear pain, hearing loss, postnasal drip, sinus pressure, sore throat and trouble swallowing.    Eyes:  Negative for pain and redness.   Respiratory:  Negative for cough, shortness of breath and wheezing.    Cardiovascular:  Negative for chest pain and palpitations.   Gastrointestinal:  Negative for abdominal pain, constipation, diarrhea, nausea and vomiting.   Skin:  Negative for color change and rash.   Neurological:  Negative for dizziness, weakness, light-headedness and headaches.   Psychiatric/Behavioral:  Negative for suicidal ideas. The patient is not nervous/anxious.    All other systems reviewed and are negative.           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    Thyroid nodule    Hyperthyroidism    Thyroiditis    Subclinical hypothyroidism    Family history of osteoporosis         Past Medical History:   Diagnosis Date    Hyperthyroidism      Past Surgical History   Procedure Laterality Date    Appendectomy  1996     Social History     Socioeconomic History    Marital status:      Spouse name: Not on file    Number of children: Not on file    Years of education: Not on file    Highest education level: Not on file   Occupational History    Not on file   Tobacco Use    Smoking  status: Never    Smokeless tobacco: Never   Vaping Use    Vaping status: Never Used   Substance and Sexual Activity    Alcohol use: No    Drug use: No    Sexual activity: Not on file   Other Topics Concern    Not on file   Social History Narrative    Not on file     Social Drivers of Health     Financial Resource Strain: Not on file   Food Insecurity: Not on file   Transportation Needs: Not on file   Physical Activity: Not on file   Stress: Not on file   Social Connections: Not on file   Intimate Partner Violence: Not on file   Housing Stability: Not on file     Family History   Problem Relation Name Age of Onset    No Known Problems Biological Mother      No Known Problems Biological Father         Allergies:  Patient has no known allergies.      Outpatient Encounter Medications as of 10/2/2024:     CALCIUM ORAL, Take 1 tablet by mouth daily.    cranberry fruit extract (CRANBERRY ORAL), Take by mouth.         Physical Exam  Vitals and nursing note reviewed.   Constitutional:       General: She is not in acute distress.     Appearance: Normal appearance. She is well-developed. She is not ill-appearing.   HENT:      Head: Normocephalic and atraumatic.      Right Ear: Hearing, tympanic membrane, ear canal and external ear normal.      Left Ear: Hearing, tympanic membrane, ear canal and external ear normal.      Nose: Nose normal. No congestion.      Mouth/Throat:      Mouth: Mucous membranes are moist.      Pharynx: Oropharynx is clear. Uvula midline. No oropharyngeal exudate.      Tonsils: No tonsillar exudate. 0 on the right. 0 on the left.   Eyes:      General: Lids are normal. No scleral icterus.     Extraocular Movements: Extraocular movements intact.      Conjunctiva/sclera: Conjunctivae normal.      Pupils: Pupils are equal, round, and reactive to light.   Neck:      Thyroid: No thyromegaly.      Vascular: No carotid bruit.   Cardiovascular:      Rate and Rhythm: Normal rate and regular rhythm.      Pulses:  Normal pulses.           Radial pulses are 2+ on the right side and 2+ on the left side.        Dorsalis pedis pulses are 2+ on the right side and 2+ on the left side.      Heart sounds: Normal heart sounds, S1 normal and S2 normal. No murmur heard.     No friction rub. No gallop.   Pulmonary:      Effort: Pulmonary effort is normal.      Breath sounds: Normal breath sounds. No wheezing, rhonchi or rales.   Chest:      Chest wall: No deformity.   Abdominal:      General: Bowel sounds are normal. There is no distension.      Palpations: Abdomen is soft.      Tenderness: There is no abdominal tenderness. There is no guarding or rebound.   Genitourinary:     Comments: deferred  Musculoskeletal:         General: No tenderness or deformity. Normal range of motion.      Cervical back: Normal range of motion and neck supple.      Right lower leg: No edema.      Left lower leg: No edema.      Comments: No spinal tenderness in neck and upper back, diffuse tenderness in musculature in posterior neck, shoulders, and upper back   Lymphadenopathy:      Head:      Right side of head: No submandibular adenopathy.      Left side of head: No submandibular adenopathy.      Cervical: No cervical adenopathy.   Skin:     General: Skin is warm and dry.      Capillary Refill: Capillary refill takes less than 2 seconds.      Findings: No rash.   Neurological:      Mental Status: She is alert and oriented to person, place, and time.      Cranial Nerves: No cranial nerve deficit.      Deep Tendon Reflexes: Reflexes normal.      Reflex Scores:       Bicep reflexes are 2+ on the right side and 2+ on the left side.       Patellar reflexes are 2+ on the right side and 2+ on the left side.     Comments: Speaking clearly and understandably   Psychiatric:         Mood and Affect: Mood normal.         Speech: Speech normal.         Behavior: Behavior normal.         Thought Content: Thought content normal.         Judgment: Judgment normal.  "          Vitals:    10/02/24 1354   BP: 98/62   Pulse: 74   Resp: 13   Temp: 36.7 °C (98.1 °F)   SpO2: 98%   Weight: 55.1 kg (121 lb 8 oz)   Height: 1.626 m (5' 4\")       Ht Readings from Last 3 Encounters:   10/02/24 1.626 m (5' 4\")   10/11/23 1.632 m (5' 4.25\")   03/16/23 1.632 m (5' 4.25\")     Wt Readings from Last 10 Encounters:   10/02/24 55.1 kg (121 lb 8 oz)   10/11/23 56.7 kg (125 lb)   05/26/23 55.8 kg (123 lb)   03/16/23 56.4 kg (124 lb 4 oz)   01/25/23 55.4 kg (122 lb 2 oz)   03/18/22 53.3 kg (117 lb 9.6 oz)   11/22/21 51.7 kg (114 lb)   11/05/21 50.2 kg (110 lb 9.6 oz)   10/27/21 52.2 kg (115 lb)   10/04/19 58.7 kg (129 lb 8 oz)       Body mass index is 20.86 kg/m².       Lab Results   Component Value Date    WBC 6.8 12/15/2023    HGB 13.2 12/15/2023    HCT 39.7 12/15/2023     12/15/2023     12/19/2023    K 4.3 12/19/2023     12/19/2023    BUN 9 12/19/2023    ALT 14 12/15/2023    AST 18 12/15/2023    CHOL 198 12/15/2023    TRIG 93 12/15/2023    HDL 68 12/15/2023    LDLCALC 113 (H) 12/15/2023    TSH 2.620 12/15/2023       No results found for: \"HGBA1C\"    Results for orders placed or performed in visit on 10/02/24   POCT urinalysis dipstick   Result Value Ref Range    Color, UA Yellow     Clarity, UA Clear     Glucose, UA Negative     Bilirubin, UA Negative     Ketones, UA Negative     Spec Grav, UA 1.005     Blood, UA Negative     pH, UA 6.0     Protein, UA Negative     Urobilinogen, UA 0.2     Leukocytes, UA Negative     Nitrite, UA Negative     Expiration Date 0     Lot Number 0     POCT  0           Assessment   Diagnoses and all orders for this visit:    Routine medical exam (Primary)  Recommend healthy diet/ exercise  Encourage weight management to appropriate goal as discussed with patient.  Check labs as listed  Cont routine dental, eye, gyn exams  Due for tdap- pt will return    -     POCT urinalysis dipstick  -     Comprehensive metabolic panel; Future  -     CBC " and Differential; Future  -     Lipid panel; Future    Upper back pain  Heat to upper back  Refer to PT for enrique    -     Ambulatory referral to Physical Therapy; Future    Cervicalgia  Check xray  Refer to pt    -     X-RAY CERVICAL SPINE COMPLETE 4 OR 5 VIEWS; Future  -     Ambulatory referral to Physical Therapy; Future    Subclinical hypothyroidism  Check thyroid labs as listed    -     TSH 3rd Generation; Future  -     T4, free; Future  -     T3, free; Future    Vitamin D deficiency  -     Vitamin D 25 hydroxy; Future    Screening mammogram for breast cancer  -     BI SCREENING MAMMOGRAM BILATERAL(TOMOSYNTHESIS); Future      Patient has been educated on the risks, benefits, and side effects of all medication prescribed at this visit or dose changes.  Patient expressed understanding and agreement with plan.  Return in about 1 year (around 10/2/2025) for Physical.  Nguyen Barrera MD  10/13/2024       Patient Instructions   Recommended Physical Therapy Locations    [place] / [place]:  [facility]  [address] [place] [address] [place] [address]  [phone]    [place]:   [facility]   [address] [facility] [place]  [phone]    [place]:   [facility] in [place] @ [facility]  [address]  [phone]

## 2024-10-03 ENCOUNTER — TELEPHONE (OUTPATIENT)
Dept: FAMILY MEDICINE | Facility: CLINIC | Age: 54
End: 2024-10-03
Payer: COMMERCIAL

## 2024-10-03 NOTE — TELEPHONE ENCOUNTER
----- Message from Nguyen Barrera sent at 10/2/2024  4:04 PM EDT -----  Some mild degenerative changes of spine with some disc space narrowing. I strongly recommend starting PT as discussed at apt.

## 2024-10-04 ENCOUNTER — CLINICAL SUPPORT (OUTPATIENT)
Dept: FAMILY MEDICINE | Facility: CLINIC | Age: 54
End: 2024-10-04
Payer: COMMERCIAL

## 2024-10-04 PROCEDURE — 90471 IMMUNIZATION ADMIN: CPT | Performed by: FAMILY MEDICINE

## 2024-10-04 PROCEDURE — 90715 TDAP VACCINE 7 YRS/> IM: CPT | Performed by: FAMILY MEDICINE

## 2024-10-13 ASSESSMENT — ENCOUNTER SYMPTOMS
FATIGUE: 0
NAUSEA: 0
DIZZINESS: 0
HEADACHES: 0
PALPITATIONS: 0
SHORTNESS OF BREATH: 0
EYE REDNESS: 0
FEVER: 0
VOMITING: 0
COLOR CHANGE: 0
WHEEZING: 0
ABDOMINAL PAIN: 0
TROUBLE SWALLOWING: 0
UNEXPECTED WEIGHT CHANGE: 0
DIARRHEA: 0
CONSTIPATION: 0
WEAKNESS: 0
SINUS PRESSURE: 0
EYE PAIN: 0
NERVOUS/ANXIOUS: 0
COUGH: 0
SORE THROAT: 0
CHILLS: 0
LIGHT-HEADEDNESS: 0

## 2024-10-22 ENCOUNTER — TELEPHONE (OUTPATIENT)
Dept: FAMILY MEDICINE | Facility: CLINIC | Age: 54
End: 2024-10-22
Payer: COMMERCIAL

## 2024-10-22 LAB
25(OH)D3+25(OH)D2 SERPL-MCNC: 38.3 NG/ML (ref 30–100)
ALBUMIN SERPL-MCNC: 4.5 G/DL (ref 3.8–4.9)
ALP SERPL-CCNC: 72 IU/L (ref 44–121)
ALT SERPL-CCNC: 12 IU/L (ref 0–32)
AST SERPL-CCNC: 17 IU/L (ref 0–40)
BASOPHILS # BLD AUTO: 0 X10E3/UL (ref 0–0.2)
BASOPHILS NFR BLD AUTO: 1 %
BILIRUB SERPL-MCNC: 0.5 MG/DL (ref 0–1.2)
BUN SERPL-MCNC: 9 MG/DL (ref 6–24)
BUN/CREAT SERPL: 16 (ref 9–23)
CALCIUM SERPL-MCNC: 9.3 MG/DL (ref 8.7–10.2)
CHLORIDE SERPL-SCNC: 100 MMOL/L (ref 96–106)
CHOLEST SERPL-MCNC: 198 MG/DL (ref 100–199)
CO2 SERPL-SCNC: 24 MMOL/L (ref 20–29)
CREAT SERPL-MCNC: 0.55 MG/DL (ref 0.57–1)
EGFRCR SERPLBLD CKD-EPI 2021: 110 ML/MIN/1.73
EOSINOPHIL # BLD AUTO: 0 X10E3/UL (ref 0–0.4)
EOSINOPHIL NFR BLD AUTO: 1 %
ERYTHROCYTE [DISTWIDTH] IN BLOOD BY AUTOMATED COUNT: 12.2 % (ref 11.7–15.4)
GLOBULIN SER CALC-MCNC: 2.5 G/DL (ref 1.5–4.5)
GLUCOSE SERPL-MCNC: 91 MG/DL (ref 70–99)
HCT VFR BLD AUTO: 40.6 % (ref 34–46.6)
HDLC SERPL-MCNC: 71 MG/DL
HGB BLD-MCNC: 13.1 G/DL (ref 11.1–15.9)
IMM GRANULOCYTES # BLD AUTO: 0 X10E3/UL (ref 0–0.1)
IMM GRANULOCYTES NFR BLD AUTO: 0 %
LDLC SERPL CALC-MCNC: 115 MG/DL (ref 0–99)
LYMPHOCYTES # BLD AUTO: 1.3 X10E3/UL (ref 0.7–3.1)
LYMPHOCYTES NFR BLD AUTO: 30 %
MCH RBC QN AUTO: 31.5 PG (ref 26.6–33)
MCHC RBC AUTO-ENTMCNC: 32.3 G/DL (ref 31.5–35.7)
MCV RBC AUTO: 98 FL (ref 79–97)
MONOCYTES # BLD AUTO: 0.3 X10E3/UL (ref 0.1–0.9)
MONOCYTES NFR BLD AUTO: 6 %
NEUTROPHILS # BLD AUTO: 2.7 X10E3/UL (ref 1.4–7)
NEUTROPHILS NFR BLD AUTO: 62 %
PLATELET # BLD AUTO: 251 X10E3/UL (ref 150–450)
POTASSIUM SERPL-SCNC: 4 MMOL/L (ref 3.5–5.2)
PROT SERPL-MCNC: 7 G/DL (ref 6–8.5)
RBC # BLD AUTO: 4.16 X10E6/UL (ref 3.77–5.28)
SODIUM SERPL-SCNC: 136 MMOL/L (ref 134–144)
T3FREE SERPL-MCNC: 2.4 PG/ML (ref 2–4.4)
T4 FREE SERPL-MCNC: 1.16 NG/DL (ref 0.82–1.77)
TRIGL SERPL-MCNC: 67 MG/DL (ref 0–149)
TSH SERPL DL<=0.005 MIU/L-ACNC: 1.75 UIU/ML (ref 0.45–4.5)
VLDLC SERPL CALC-MCNC: 12 MG/DL (ref 5–40)
WBC # BLD AUTO: 4.3 X10E3/UL (ref 3.4–10.8)

## 2024-10-22 NOTE — TELEPHONE ENCOUNTER
----- Message from Ilene Bean sent at 10/22/2024 12:27 PM EDT -----  Please let pt know that her labs look stable. There are no concerns. Thanks!

## 2024-11-02 ENCOUNTER — HOSPITAL ENCOUNTER (OUTPATIENT)
Dept: RADIOLOGY | Age: 54
Discharge: HOME | End: 2024-11-02
Attending: FAMILY MEDICINE
Payer: COMMERCIAL

## 2024-11-02 DIAGNOSIS — Z12.31 SCREENING MAMMOGRAM FOR BREAST CANCER: ICD-10-CM

## 2024-11-02 PROCEDURE — 77067 SCR MAMMO BI INCL CAD: CPT

## 2024-11-02 PROCEDURE — 77063 BREAST TOMOSYNTHESIS BI: CPT

## 2024-11-04 ENCOUNTER — TELEPHONE (OUTPATIENT)
Dept: FAMILY MEDICINE | Facility: CLINIC | Age: 54
End: 2024-11-04
Payer: COMMERCIAL

## 2024-11-04 ENCOUNTER — APPOINTMENT (OUTPATIENT)
Dept: RADIOLOGY | Age: 54
End: 2024-11-04
Payer: COMMERCIAL

## 2024-11-04 NOTE — TELEPHONE ENCOUNTER
----- Message from Nguyen Barrera sent at 11/4/2024 10:45 AM EST -----  Asymmetry in L breast, pt needs to bring a copy of her outside films for comparison

## 2024-12-18 ENCOUNTER — TRANSCRIBE ORDERS (OUTPATIENT)
Dept: REGISTRATION | Age: 54
End: 2024-12-18

## 2024-12-18 DIAGNOSIS — M54.12 RADICULOPATHY, CERVICAL REGION: ICD-10-CM

## 2024-12-18 DIAGNOSIS — M54.14 RADICULOPATHY, THORACIC REGION: ICD-10-CM

## 2024-12-18 DIAGNOSIS — M54.16 RADICULOPATHY, LUMBAR REGION: ICD-10-CM

## 2025-02-26 ENCOUNTER — PATIENT OUTREACH (OUTPATIENT)
Dept: FAMILY MEDICINE | Facility: CLINIC | Age: 55
End: 2025-02-26
Payer: COMMERCIAL

## 2025-02-26 NOTE — PROGRESS NOTES
Care Gap Team has outreached to Emma Bojorquez on behalf of their primary care provider.      Care Gap Source: Tasneem Ojeda    Care Gap(s) Identified: Cervical Cancer Screening, Colorectal Cancer Screening    Care Gap Status: Due    Outreach via: Telephone    Adult Preventive Wellness Protocol(s) Used: Cervical Cancer Screening, Colorectal Cancer Screening    Chart Review Completed: Yes  Patient interview completed: No  Inclusion Criteria: Met  Exclusion Criteria: None  Patient educated on recommended care: No    Order or Clinical Referral: None          Appointment provided: No              Called and left VM for patient letting them know that they are due for their colorectal cancer screening and cervical cancer screening.  Asked patient to return my call.